# Patient Record
Sex: FEMALE | Race: WHITE | NOT HISPANIC OR LATINO | Employment: FULL TIME | URBAN - METROPOLITAN AREA
[De-identification: names, ages, dates, MRNs, and addresses within clinical notes are randomized per-mention and may not be internally consistent; named-entity substitution may affect disease eponyms.]

---

## 2023-02-24 PROBLEM — M25.552 LEFT HIP PAIN: Status: ACTIVE | Noted: 2023-02-24

## 2023-02-24 PROBLEM — L04.9 LYMPHADENITIS, ACUTE: Status: RESOLVED | Noted: 2023-02-24 | Resolved: 2023-02-24

## 2023-02-24 PROBLEM — M70.62 TROCHANTERIC BURSITIS OF LEFT HIP: Status: RESOLVED | Noted: 2023-02-24 | Resolved: 2023-02-24

## 2023-02-24 PROBLEM — H04.123 DRY EYE SYNDROME OF BOTH EYES: Status: ACTIVE | Noted: 2023-02-24

## 2023-02-24 PROBLEM — Z20.822 SUSPECTED COVID-19 VIRUS INFECTION: Status: RESOLVED | Noted: 2023-02-24 | Resolved: 2023-02-24

## 2023-02-24 PROBLEM — K21.9 LARYNGOPHARYNGEAL REFLUX: Status: ACTIVE | Noted: 2023-02-24

## 2023-02-24 PROBLEM — E78.00 ELEVATED SERUM CHOLESTEROL: Status: ACTIVE | Noted: 2023-02-24

## 2023-02-24 PROBLEM — R19.5 POSITIVE COLORECTAL CANCER SCREENING USING COLOGUARD TEST: Status: RESOLVED | Noted: 2023-02-24 | Resolved: 2023-02-24

## 2023-02-24 PROBLEM — M47.812 DJD (DEGENERATIVE JOINT DISEASE), CERVICAL: Status: ACTIVE | Noted: 2023-02-24

## 2023-02-24 PROBLEM — R53.83 OTHER FATIGUE: Status: ACTIVE | Noted: 2023-02-24

## 2023-02-24 PROBLEM — E55.9 VITAMIN D DEFICIENCY: Status: ACTIVE | Noted: 2023-02-24

## 2023-02-24 RX ORDER — FLUTICASONE PROPIONATE 50 MCG
SPRAY, SUSPENSION (ML) NASAL
COMMUNITY
End: 2024-03-19 | Stop reason: ALTCHOICE

## 2023-02-24 RX ORDER — FAMOTIDINE 20 MG/1
20 TABLET, FILM COATED ORAL 2 TIMES DAILY
COMMUNITY
End: 2024-04-23 | Stop reason: SDUPTHER

## 2023-02-24 RX ORDER — FERROUS SULFATE 325(65) MG
65 TABLET, DELAYED RELEASE (ENTERIC COATED) ORAL
COMMUNITY
End: 2023-05-04 | Stop reason: SDUPTHER

## 2023-02-24 RX ORDER — MELOXICAM 15 MG/1
15 TABLET ORAL
COMMUNITY
Start: 2022-09-01 | End: 2023-05-09 | Stop reason: SDUPTHER

## 2023-03-30 ENCOUNTER — TELEPHONE (OUTPATIENT)
Dept: PRIMARY CARE | Facility: CLINIC | Age: 63
End: 2023-03-30
Payer: COMMERCIAL

## 2023-03-30 NOTE — TELEPHONE ENCOUNTER
----- Message from LINDA Hernandez-CNP sent at 3/30/2023  7:42 AM EDT -----  Please advise Mammogram is normal Repeat one year

## 2023-04-03 ENCOUNTER — TELEPHONE (OUTPATIENT)
Dept: PRIMARY CARE | Facility: CLINIC | Age: 63
End: 2023-04-03
Payer: COMMERCIAL

## 2023-04-03 NOTE — TELEPHONE ENCOUNTER
PT is concerned about spot that have formed on her face and is requesting a dermatologist (or if she should be seen again in office) PT is concerned the spots could be something more. No pain or irritation from spots.

## 2023-04-05 DIAGNOSIS — L98.9 SKIN LESION OF FACE: Primary | ICD-10-CM

## 2023-04-13 ENCOUNTER — OFFICE VISIT (OUTPATIENT)
Dept: PRIMARY CARE | Facility: CLINIC | Age: 63
End: 2023-04-13
Payer: COMMERCIAL

## 2023-04-13 ENCOUNTER — APPOINTMENT (OUTPATIENT)
Dept: PRIMARY CARE | Facility: CLINIC | Age: 63
End: 2023-04-13
Payer: COMMERCIAL

## 2023-04-13 VITALS
DIASTOLIC BLOOD PRESSURE: 80 MMHG | SYSTOLIC BLOOD PRESSURE: 124 MMHG | HEART RATE: 77 BPM | HEIGHT: 64 IN | BODY MASS INDEX: 28.17 KG/M2 | WEIGHT: 165 LBS | OXYGEN SATURATION: 98 %

## 2023-04-13 DIAGNOSIS — I83.891 VARICOSE VEINS OF RIGHT LOWER EXTREMITY WITH EDEMA: ICD-10-CM

## 2023-04-13 DIAGNOSIS — M25.552 LEFT HIP PAIN: ICD-10-CM

## 2023-04-13 DIAGNOSIS — J30.9 ALLERGIC RHINITIS, UNSPECIFIED SEASONALITY, UNSPECIFIED TRIGGER: Primary | ICD-10-CM

## 2023-04-13 PROCEDURE — 1036F TOBACCO NON-USER: CPT | Performed by: NURSE PRACTITIONER

## 2023-04-13 PROCEDURE — 99214 OFFICE O/P EST MOD 30 MIN: CPT | Performed by: NURSE PRACTITIONER

## 2023-04-13 RX ORDER — AZELASTINE 1 MG/ML
1 SPRAY, METERED NASAL 2 TIMES DAILY
Qty: 30 ML | Refills: 12 | Status: SHIPPED | OUTPATIENT
Start: 2023-04-13 | End: 2023-09-15 | Stop reason: ALTCHOICE

## 2023-04-13 RX ORDER — CHOLECALCIFEROL (VITAMIN D3) 50 MCG
50 TABLET ORAL DAILY
COMMUNITY

## 2023-04-13 ASSESSMENT — COLUMBIA-SUICIDE SEVERITY RATING SCALE - C-SSRS
1. IN THE PAST MONTH, HAVE YOU WISHED YOU WERE DEAD OR WISHED YOU COULD GO TO SLEEP AND NOT WAKE UP?: NO
6. HAVE YOU EVER DONE ANYTHING, STARTED TO DO ANYTHING, OR PREPARED TO DO ANYTHING TO END YOUR LIFE?: NO
2. HAVE YOU ACTUALLY HAD ANY THOUGHTS OF KILLING YOURSELF?: NO

## 2023-04-13 ASSESSMENT — ENCOUNTER SYMPTOMS
PSYCHIATRIC NEGATIVE: 1
JOINT SWELLING: 1
RESPIRATORY NEGATIVE: 1
CONSTITUTIONAL NEGATIVE: 1
CARDIOVASCULAR NEGATIVE: 1
RHINORRHEA: 1

## 2023-04-13 ASSESSMENT — PATIENT HEALTH QUESTIONNAIRE - PHQ9
1. LITTLE INTEREST OR PLEASURE IN DOING THINGS: NOT AT ALL
2. FEELING DOWN, DEPRESSED OR HOPELESS: NOT AT ALL
SUM OF ALL RESPONSES TO PHQ9 QUESTIONS 1 AND 2: 0

## 2023-04-13 NOTE — PROGRESS NOTES
"Subjective   Patient ID: Fifi Avila is a 62 y.o. female who presents for Follow-up (6 month).    Patient here for routine follow-up.  Patient did see ophthalmologist due to dry eye syndrome.  Patient was found to have some blood behind her eye.  Patient was curious if this causes anemia.  Discussed with patient I do not feel this is difficult amount of blood in her eye to cause anemia.  I feel it was related to her giving blood and not feeling well.  We will recheck blood levels.    Patient did follow-up with physical therapy regarding her hip pain.  She states this is much improved.    Patient reports swelling below her right knee seems to worsen as the day goes on or if she standing for long periods of time she gets leg pain or fatigue.         Review of Systems   Constitutional: Negative.    HENT:  Positive for congestion, postnasal drip and rhinorrhea.    Respiratory: Negative.     Cardiovascular: Negative.    Musculoskeletal:  Positive for joint swelling.   Psychiatric/Behavioral: Negative.         Objective   /80   Pulse 77   Ht 1.626 m (5' 4\")   Wt 74.8 kg (165 lb)   SpO2 98%   BMI 28.32 kg/m²     Physical Exam  Constitutional:       Appearance: Normal appearance.   HENT:      Nose: Rhinorrhea present. No congestion.      Mouth/Throat:      Mouth: Mucous membranes are moist.   Eyes:      Conjunctiva/sclera: Conjunctivae normal.   Cardiovascular:      Rate and Rhythm: Normal rate.      Heart sounds: Normal heart sounds.   Pulmonary:      Effort: Pulmonary effort is normal.      Breath sounds: Normal breath sounds.   Musculoskeletal:      Right lower leg: Swelling present. No deformity, tenderness or bony tenderness. No edema.      Comments: Right lower leg medial aspect just below knee with some swelling noted.  Multiple varicose veins noted throughout lower extremity   Neurological:      Mental Status: She is alert.         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 Worsens throughout day worse with standing and has increasing pain. History of injury to right knee medial aspect.             Musculoskeletal    Left hip pain     She had several PT appointment\s             Other    Allergic rhinitis - Primary     Seasonal allergies .          Relevant Medications    azelastine (Astelin) 137 mcg (0.1 %) nasal spray    Other Relevant Orders    Referral to Vascular Medicine

## 2023-04-13 NOTE — PATIENT INSTRUCTIONS
Dr Crandall- Vascular specialist  [address] [place] [address] [phone]    I do not feel that you have swelling in your salivary gland at this time.  However if the tenderness continues please follow-up with the specialist the ENT    Please continue to do her stretching exercises    For the swelling in your right lower leg below your knee I placed a referral to a vascular specialist phone number as above    Please follow-up per routine every 6 months sooner if needed

## 2023-04-13 NOTE — ASSESSMENT & PLAN NOTE
Pt with increasing swelling in right knee. Worsens throughout day worse with standing and has increasing pain. History of injury to right knee medial aspect.

## 2023-04-18 ENCOUNTER — TELEPHONE (OUTPATIENT)
Dept: PRIMARY CARE | Facility: CLINIC | Age: 63
End: 2023-04-18
Payer: COMMERCIAL

## 2023-04-18 NOTE — TELEPHONE ENCOUNTER
----- Message from Fifi Avila sent at 4/4/2023  7:27 PM EDT -----  Regarding: Have you received my Eye records   Contact: [phone]  When i had my eyes checked in December the dr said there was blood inside my eye (not blood vessels on the white, but inside)    I requested they send you the records for review incase it may be anemia related

## 2023-05-03 DIAGNOSIS — Z00.00 ENCOUNTER FOR GENERAL ADULT MEDICAL EXAMINATION WITHOUT ABNORMAL FINDINGS: ICD-10-CM

## 2023-05-04 RX ORDER — FERROUS SULFATE 325(65) MG
TABLET ORAL
Qty: 90 TABLET | Refills: 1 | Status: SHIPPED | OUTPATIENT
Start: 2023-05-04 | End: 2024-04-23 | Stop reason: SDUPTHER

## 2023-05-09 DIAGNOSIS — M25.552 LEFT HIP PAIN: ICD-10-CM

## 2023-05-09 RX ORDER — MELOXICAM 15 MG/1
15 TABLET ORAL DAILY
Qty: 90 TABLET | Refills: 1 | Status: SHIPPED | OUTPATIENT
Start: 2023-05-09

## 2023-05-18 ENCOUNTER — OFFICE VISIT (OUTPATIENT)
Dept: PRIMARY CARE | Facility: CLINIC | Age: 63
End: 2023-05-18
Payer: COMMERCIAL

## 2023-05-18 VITALS
HEIGHT: 64 IN | BODY MASS INDEX: 28.61 KG/M2 | WEIGHT: 167.6 LBS | SYSTOLIC BLOOD PRESSURE: 128 MMHG | HEART RATE: 80 BPM | OXYGEN SATURATION: 98 % | DIASTOLIC BLOOD PRESSURE: 78 MMHG

## 2023-05-18 DIAGNOSIS — M62.830 LUMBAR PARASPINAL MUSCLE SPASM: ICD-10-CM

## 2023-05-18 DIAGNOSIS — M54.50 LUMBAR PAIN: Primary | ICD-10-CM

## 2023-05-18 PROBLEM — E78.00 ELEVATED SERUM CHOLESTEROL: Status: RESOLVED | Noted: 2023-02-24 | Resolved: 2023-05-18

## 2023-05-18 PROCEDURE — 99213 OFFICE O/P EST LOW 20 MIN: CPT | Performed by: NURSE PRACTITIONER

## 2023-05-18 PROCEDURE — 1036F TOBACCO NON-USER: CPT | Performed by: NURSE PRACTITIONER

## 2023-05-18 RX ORDER — TIZANIDINE 4 MG/1
4 TABLET ORAL EVERY 6 HOURS PRN
Qty: 30 TABLET | Refills: 0 | Status: SHIPPED | OUTPATIENT
Start: 2023-05-18 | End: 2024-03-19 | Stop reason: ALTCHOICE

## 2023-05-18 ASSESSMENT — COLUMBIA-SUICIDE SEVERITY RATING SCALE - C-SSRS
6. HAVE YOU EVER DONE ANYTHING, STARTED TO DO ANYTHING, OR PREPARED TO DO ANYTHING TO END YOUR LIFE?: NO
2. HAVE YOU ACTUALLY HAD ANY THOUGHTS OF KILLING YOURSELF?: NO
1. IN THE PAST MONTH, HAVE YOU WISHED YOU WERE DEAD OR WISHED YOU COULD GO TO SLEEP AND NOT WAKE UP?: NO

## 2023-05-18 ASSESSMENT — ENCOUNTER SYMPTOMS
BACK PAIN: 1
NEUROLOGICAL NEGATIVE: 1
SORE THROAT: 1
PSYCHIATRIC NEGATIVE: 1
CARDIOVASCULAR NEGATIVE: 1
CONSTITUTIONAL NEGATIVE: 1
GASTROINTESTINAL NEGATIVE: 1

## 2023-05-18 NOTE — PROGRESS NOTES
"Subjective   Patient ID: Fifi Avila is a 63 y.o. female who presents for Back Pain (For about 7 days. Pt states that she leaned over to look in a mirror and felt a twinge in her low back no it is in her mid to low back on the right side) and Sore Throat.    Pt here for acute lower back pain.  She has tried a heat/ice.  She has been taking naproxen 1 tablet twice a day instead of the Mobic.  Patient states she was gardening when it flared and feels its not improving.  Also has noticed increase in her allergy symptoms.  But has not been using the nasal sprays.  She knows the pollens been high.         Review of Systems   Constitutional: Negative.    HENT:  Positive for postnasal drip and sore throat.    Cardiovascular: Negative.    Gastrointestinal: Negative.    Musculoskeletal:  Positive for back pain.        See HPI   Neurological: Negative.    Psychiatric/Behavioral: Negative.         Objective   /78   Pulse 80   Ht 1.626 m (5' 4\")   Wt 76 kg (167 lb 9.6 oz)   SpO2 98%   BMI 28.77 kg/m²     Physical Exam  Cardiovascular:      Rate and Rhythm: Normal rate.      Heart sounds: Normal heart sounds.   Pulmonary:      Effort: Pulmonary effort is normal.      Breath sounds: Normal breath sounds.   Musculoskeletal:         General: Normal range of motion.      Lumbar back: Spasms and tenderness present. Negative right straight leg raise test and negative left straight leg raise test.   Skin:     Capillary Refill: Capillary refill takes less than 2 seconds.   Neurological:      Mental Status: She is alert.   Psychiatric:         Mood and Affect: Mood normal.         Behavior: Behavior normal.         Thought Content: Thought content normal.         Judgment: Judgment normal.         Assessment/Plan   Problem List Items Addressed This Visit    None  Visit Diagnoses       Lumbar pain    -  Primary    Relevant Medications    tiZANidine (Zanaflex) 4 mg tablet    Lumbar paraspinal muscle spasm        Relevant " Medications    tiZANidine (Zanaflex) 4 mg tablet          Discussed with patient do stretches we will try the Zanaflex in addition to the anti-inflammatory if symptoms worsen we will consider physical therapy referral

## 2023-05-18 NOTE — PATIENT INSTRUCTIONS
Please take your meloxicam daily you can take a half tab in the morning half in the evening if this helps with the pain better.  Or you can do the naproxen 2 tablets twice a day.  As we discussed you already know you do not want to take them both together.  You can continue to use heat and ice.  I also sent a muscle relaxer and printed out stretching exercises.  If symptoms worsen or do not improve may need to consider physical therapy consult

## 2023-05-24 ENCOUNTER — TELEPHONE (OUTPATIENT)
Dept: PRIMARY CARE | Facility: CLINIC | Age: 63
End: 2023-05-24
Payer: COMMERCIAL

## 2023-05-24 DIAGNOSIS — M25.561 ACUTE PAIN OF RIGHT KNEE: ICD-10-CM

## 2023-05-24 DIAGNOSIS — M25.552 LEFT HIP PAIN: ICD-10-CM

## 2023-05-24 NOTE — TELEPHONE ENCOUNTER
Pt is asking for a referral to [facility] regarding what was discussed at visit on 5/18/2023

## 2023-06-13 ENCOUNTER — OFFICE VISIT (OUTPATIENT)
Dept: PRIMARY CARE | Facility: CLINIC | Age: 63
End: 2023-06-13
Payer: COMMERCIAL

## 2023-06-13 VITALS
DIASTOLIC BLOOD PRESSURE: 76 MMHG | HEIGHT: 64 IN | WEIGHT: 167.6 LBS | BODY MASS INDEX: 28.61 KG/M2 | HEART RATE: 74 BPM | OXYGEN SATURATION: 99 % | SYSTOLIC BLOOD PRESSURE: 122 MMHG

## 2023-06-13 DIAGNOSIS — J01.10 ACUTE NON-RECURRENT FRONTAL SINUSITIS: Primary | ICD-10-CM

## 2023-06-13 PROCEDURE — 1036F TOBACCO NON-USER: CPT | Performed by: NURSE PRACTITIONER

## 2023-06-13 PROCEDURE — 99213 OFFICE O/P EST LOW 20 MIN: CPT | Performed by: NURSE PRACTITIONER

## 2023-06-13 RX ORDER — AMOXICILLIN AND CLAVULANATE POTASSIUM 875; 125 MG/1; MG/1
875 TABLET, FILM COATED ORAL 2 TIMES DAILY
Qty: 14 TABLET | Refills: 0 | Status: SHIPPED | OUTPATIENT
Start: 2023-06-13 | End: 2023-06-20

## 2023-06-13 RX ORDER — CETIRIZINE HYDROCHLORIDE 5 MG/1
5 TABLET ORAL DAILY
COMMUNITY
End: 2023-09-15 | Stop reason: ALTCHOICE

## 2023-06-13 ASSESSMENT — PATIENT HEALTH QUESTIONNAIRE - PHQ9
SUM OF ALL RESPONSES TO PHQ9 QUESTIONS 1 AND 2: 0
1. LITTLE INTEREST OR PLEASURE IN DOING THINGS: NOT AT ALL
2. FEELING DOWN, DEPRESSED OR HOPELESS: NOT AT ALL

## 2023-06-13 ASSESSMENT — COLUMBIA-SUICIDE SEVERITY RATING SCALE - C-SSRS
2. HAVE YOU ACTUALLY HAD ANY THOUGHTS OF KILLING YOURSELF?: NO
1. IN THE PAST MONTH, HAVE YOU WISHED YOU WERE DEAD OR WISHED YOU COULD GO TO SLEEP AND NOT WAKE UP?: NO
6. HAVE YOU EVER DONE ANYTHING, STARTED TO DO ANYTHING, OR PREPARED TO DO ANYTHING TO END YOUR LIFE?: NO

## 2023-06-13 ASSESSMENT — ENCOUNTER SYMPTOMS
HOARSE VOICE: 1
COUGH: 1
SHORTNESS OF BREATH: 1
SINUS PRESSURE: 1

## 2023-06-13 NOTE — PROGRESS NOTES
"Subjective   Patient ID: Fifi Avila is a 63 y.o. female who presents for Laryngitis, Sinusitis, and Nasal Congestion.    Sinusitis  The current episode started in the past 7 days. The problem has been gradually worsening since onset. There has been no fever. The pain is mild. Associated symptoms include congestion, coughing, a hoarse voice, shortness of breath, sinus pressure and sneezing. Past treatments include oral decongestants and nasal decongestants. The treatment provided mild relief.        Review of Systems   HENT:  Positive for congestion, hoarse voice, sinus pressure and sneezing.    Respiratory:  Positive for cough and shortness of breath.        Objective   /76   Pulse 74   Ht 1.626 m (5' 4\")   Wt 76 kg (167 lb 9.6 oz)   SpO2 99%   BMI 28.77 kg/m²     Physical Exam  Vitals reviewed.   HENT:      Right Ear: Tympanic membrane, ear canal and external ear normal.      Left Ear: Tympanic membrane, ear canal and external ear normal.      Nose: Congestion present.   Cardiovascular:      Rate and Rhythm: Normal rate.      Heart sounds: Normal heart sounds.   Pulmonary:      Effort: Pulmonary effort is normal.   Abdominal:      General: Abdomen is flat.   Skin:     General: Skin is warm.      Capillary Refill: Capillary refill takes less than 2 seconds.   Neurological:      General: No focal deficit present.      Mental Status: She is alert.         Assessment/Plan   Problem List Items Addressed This Visit    None  Visit Diagnoses       Acute non-recurrent frontal sinusitis    -  Primary    Relevant Medications    amoxicillin-pot clavulanate (Augmentin) 875-125 mg tablet               "

## 2023-06-21 DIAGNOSIS — J30.9 ALLERGIC RHINITIS, UNSPECIFIED SEASONALITY, UNSPECIFIED TRIGGER: Primary | ICD-10-CM

## 2023-09-15 ENCOUNTER — OFFICE VISIT (OUTPATIENT)
Dept: PRIMARY CARE | Facility: CLINIC | Age: 63
End: 2023-09-15
Payer: COMMERCIAL

## 2023-09-15 VITALS
HEIGHT: 64 IN | BODY MASS INDEX: 28.95 KG/M2 | DIASTOLIC BLOOD PRESSURE: 70 MMHG | TEMPERATURE: 97.8 F | WEIGHT: 169.6 LBS | HEART RATE: 93 BPM | OXYGEN SATURATION: 98 % | SYSTOLIC BLOOD PRESSURE: 118 MMHG

## 2023-09-15 DIAGNOSIS — J01.10 ACUTE NON-RECURRENT FRONTAL SINUSITIS: Primary | ICD-10-CM

## 2023-09-15 PROCEDURE — 99213 OFFICE O/P EST LOW 20 MIN: CPT | Performed by: NURSE PRACTITIONER

## 2023-09-15 PROCEDURE — 1036F TOBACCO NON-USER: CPT | Performed by: NURSE PRACTITIONER

## 2023-09-15 RX ORDER — SULFAMETHOXAZOLE AND TRIMETHOPRIM 800; 160 MG/1; MG/1
1 TABLET ORAL 2 TIMES DAILY
Qty: 14 TABLET | Refills: 0 | Status: SHIPPED | OUTPATIENT
Start: 2023-09-15 | End: 2023-09-22

## 2023-09-15 RX ORDER — AZELASTINE HYDROCHLORIDE, FLUTICASONE PROPIONATE 137; 50 UG/1; UG/1
SPRAY, METERED NASAL
COMMUNITY
Start: 2023-07-14 | End: 2024-04-23 | Stop reason: SDUPTHER

## 2023-09-15 RX ORDER — LEVOCETIRIZINE DIHYDROCHLORIDE 5 MG/1
5 TABLET, FILM COATED ORAL EVERY EVENING
COMMUNITY
Start: 2023-07-14 | End: 2024-04-30 | Stop reason: ALTCHOICE

## 2023-09-15 ASSESSMENT — ENCOUNTER SYMPTOMS
CHILLS: 0
HOARSE VOICE: 0
DIAPHORESIS: 0
HEADACHES: 1
SORE THROAT: 1
SINUS PRESSURE: 1
SHORTNESS OF BREATH: 0
NECK PAIN: 0
SWOLLEN GLANDS: 0
COUGH: 1

## 2023-09-15 ASSESSMENT — COLUMBIA-SUICIDE SEVERITY RATING SCALE - C-SSRS
2. HAVE YOU ACTUALLY HAD ANY THOUGHTS OF KILLING YOURSELF?: NO
6. HAVE YOU EVER DONE ANYTHING, STARTED TO DO ANYTHING, OR PREPARED TO DO ANYTHING TO END YOUR LIFE?: NO
1. IN THE PAST MONTH, HAVE YOU WISHED YOU WERE DEAD OR WISHED YOU COULD GO TO SLEEP AND NOT WAKE UP?: NO

## 2023-09-15 NOTE — PROGRESS NOTES
"Subjective   Patient ID: Fifinigel Avila is a 63 y.o. female who presents for Sinusitis.    Sinusitis  This is a new problem. The current episode started 1 to 4 weeks ago. The problem has been waxing and waning since onset. There has been no fever. Her pain is at a severity of 5/10. The pain is moderate. Associated symptoms include congestion, coughing, ear pain, headaches, sinus pressure, sneezing and a sore throat. Pertinent negatives include no chills, diaphoresis, hoarse voice, neck pain, shortness of breath or swollen glands. Treatments tried: mucinexD. The treatment provided mild relief.        Review of Systems   Constitutional:  Negative for chills and diaphoresis.   HENT:  Positive for congestion, ear pain, sinus pressure, sneezing and sore throat. Negative for hoarse voice.    Respiratory:  Positive for cough. Negative for shortness of breath.    Musculoskeletal:  Negative for neck pain.   Neurological:  Positive for headaches.       Objective   /70   Pulse 93   Temp 36.6 °C (97.8 °F)   Ht 1.626 m (5' 4\")   Wt 76.9 kg (169 lb 9.6 oz)   SpO2 98%   BMI 29.11 kg/m²     Physical Exam  HENT:      Head: Normocephalic.      Right Ear: Tympanic membrane, ear canal and external ear normal.      Left Ear: Tympanic membrane, ear canal and external ear normal.      Nose: Mucosal edema and congestion present.      Right Sinus: Maxillary sinus tenderness and frontal sinus tenderness present.      Left Sinus: Maxillary sinus tenderness and frontal sinus tenderness present.      Mouth/Throat:      Pharynx: Oropharynx is clear.   Eyes:      Conjunctiva/sclera: Conjunctivae normal.   Cardiovascular:      Rate and Rhythm: Normal rate.      Heart sounds: Normal heart sounds.   Pulmonary:      Effort: Pulmonary effort is normal.      Breath sounds: Normal breath sounds.   Skin:     General: Skin is warm.      Capillary Refill: Capillary refill takes less than 2 seconds.   Neurological:      Mental Status: She is " alert.   Psychiatric:         Mood and Affect: Mood normal.         Behavior: Behavior normal.         Thought Content: Thought content normal.         Judgment: Judgment normal.         Assessment/Plan   Problem List Items Addressed This Visit    None  Visit Diagnoses       Acute non-recurrent frontal sinusitis    -  Primary    Relevant Medications    sulfamethoxazole-trimethoprim (Bactrim DS) 800-160 mg tablet

## 2023-09-15 NOTE — PATIENT INSTRUCTIONS
What is Sinusitis?    Sinusitis, commonly known as a sinus infection, is an inflammation or infection of the sinus cavities - the hollow spaces within the bones around your nose, eyes, and cheeks. These cavities are normally filled with air, but when they become blocked and filled with fluid, they can become a breeding ground for bacteria or viruses, leading to infection.    Symptoms:    Facial Pain or Pressure: You may experience pain or pressure around your forehead, eyes, cheeks, or the bridge of your nose. This pain might worsen when you bend forward or lie down.    Nasal Congestion: Your nasal passages may feel blocked or congested, making it difficult to breathe through your nose.    Runny or Discolored Nasal Discharge: You might notice thick, discolored mucus draining from your nose or down the back of your throat.    Coughing: Postnasal drip, where mucus drips down your throat, can lead to coughing, especially at night.    Reduced Sense of Smell and Taste: Inflammation and congestion can affect your ability to smell and taste.    Headache: The pressure in your sinuses can cause headaches, often felt around the forehead and eyes.    Bad Breath: Foul-smelling breath can be a result of the infected mucus draining down the back of your throat.    Causes:    Sinusitis can be caused by various factors, including:    Viral Infections: Often triggered by the common cold or other upper respiratory infections.    Bacterial Infections: Bacterial growth in blocked sinuses can lead to an infection.    Allergies: Allergic reactions can cause inflammation and blockages in the sinus cavities.    Nasal Polyps: Small growths in the nasal passages can obstruct the sinuses.    Deviated Septum: A crooked nasal septum can impede sinus drainage.    Treatment:    Rest and Hydration: Resting and staying well-hydrated can help your body fight the infection.    Nasal Irrigation: Saline nasal sprays or rinses can help clear mucus and  relieve congestion.    Warm Compresses: Applying warm compresses over your sinuses can help soothe pain and pressure.    Pain Relievers: Over-the-counter pain relievers like acetaminophen or ibuprofen can help manage pain and fever.    Decongestants: Short-term use of decongestant nasal sprays can help reduce swelling, but they should not be used for more than a few days to avoid rebound congestion.    Antibiotics: If a bacterial infection is suspected, antibiotics are prescribed at that time.    Allergy Management: If allergies contribute to your sinusitis, managing them can help prevent future episodes.    When to Seek Medical Attention:    If your symptoms worsen or persist for more than 10 days, or if you experience severe facial pain, high fever, confusion, double vision, or difficulty breathing, it's important to seek medical attention promptly. These could be signs of a more serious infection or complication.

## 2023-10-06 ENCOUNTER — CLINICAL SUPPORT (OUTPATIENT)
Dept: PRIMARY CARE | Facility: CLINIC | Age: 63
End: 2023-10-06
Payer: COMMERCIAL

## 2023-10-06 DIAGNOSIS — Z23 NEED FOR VACCINATION: ICD-10-CM

## 2023-10-06 PROCEDURE — 90686 IIV4 VACC NO PRSV 0.5 ML IM: CPT | Performed by: FAMILY MEDICINE

## 2023-10-06 PROCEDURE — 90471 IMMUNIZATION ADMIN: CPT | Performed by: FAMILY MEDICINE

## 2023-10-06 RX ORDER — ACETAMINOPHEN 500 MG
500 TABLET ORAL EVERY 6 HOURS PRN
COMMUNITY

## 2023-10-06 RX ORDER — GLUCOSAM/CHONDRO/HERB 149/HYAL 750-100 MG
1 TABLET ORAL EVERY 24 HOURS
COMMUNITY
End: 2024-04-30 | Stop reason: ALTCHOICE

## 2023-12-18 ENCOUNTER — LAB (OUTPATIENT)
Dept: LAB | Facility: LAB | Age: 63
End: 2023-12-18
Payer: COMMERCIAL

## 2023-12-18 DIAGNOSIS — J30.1 ALLERGIC RHINITIS DUE TO POLLEN: Primary | ICD-10-CM

## 2023-12-18 PROCEDURE — 86003 ALLG SPEC IGE CRUDE XTRC EA: CPT

## 2023-12-18 PROCEDURE — 36415 COLL VENOUS BLD VENIPUNCTURE: CPT

## 2023-12-19 LAB
A ALTERNATA IGE QN: <0.1 KU/L
A FUMIGATUS IGE QN: <0.1 KU/L
BERMUDA GRASS IGE QN: <0.1 KU/L
BOXELDER IGE QN: <0.1 KU/L
CAT DANDER IGE QN: <0.1 KU/L
COMMON RAGWEED IGE QN: <0.1 KU/L
COTTONWOOD IGE QN: <0.1 KU/L
D FARINAE IGE QN: <0.1 KU/L
D PTERONYSS IGE QN: <0.1 KU/L
JOHNSON GRASS IGE QN: <0.1 KU/L
SILVER BIRCH IGE QN: <0.1 KU/L
TIMOTHY IGE QN: <0.1 KU/L
WHITE OAK IGE QN: <0.1 KU/L

## 2023-12-22 ENCOUNTER — HOSPITAL ENCOUNTER (OUTPATIENT)
Dept: RADIOLOGY | Facility: HOSPITAL | Age: 63
Discharge: HOME | End: 2023-12-22
Payer: COMMERCIAL

## 2023-12-22 DIAGNOSIS — Z13.6 ENCOUNTER FOR SCREENING FOR CARDIOVASCULAR DISORDERS: ICD-10-CM

## 2023-12-22 PROCEDURE — 75571 CT HRT W/O DYE W/CA TEST: CPT

## 2024-01-15 NOTE — PROGRESS NOTES
Subjective   Reason for Visit: Fifi Avila is a 63 y.o. female presenting as a new pt, here for a CPE/est care      No chief complaint on file.      HPI  Fifi presents today as new pt to me, due for Annual CPE  Previous PCP: KATHERIN Muhammad; LOV     PMH:  PSH:  SOCIAL:    Do you take any herbs or supplements that were not prescribed by a doctor?   Are you taking calcium supplements?   Are you taking aspirin daily?  Colon Cancer Screening:   LMP:  PAP:  Mammogram:    GYN:   Fasting blood work:   Last eye exam:   Last dental Exam:   Exercise:  Mood:  Sleep:   Diet:      Lab on 12/18/2023   Component Date Value Ref Range Status    Bernabe Grass IgE 12/18/2023 <0.10  <0.10 kU/L Final    Bermuda Grass IgE 12/18/2023 <0.10  <0.10 kU/L Final    Oak IgE 12/18/2023 <0.10  <0.10 kU/L Final    Ward IgE 12/18/2023 <0.10  <0.10 kU/L Final    Candelario Grass IgE 12/18/2023 <0.10  <0.10 kU/L Final    Aspergillus Fumigatus IgE 12/18/2023 <0.10  <0.10 kU/L Final    Cat Dander IgE 12/18/2023 <0.10  <0.10 kU/L Final    Dust Mite (D. pteronyssinus) IgE 12/18/2023 <0.10  <0.10 kU/L Final    Dust Mite (D. farinae) IgE 12/18/2023 <0.10  <0.10 kU/L Final    Box-Elder IgE 12/18/2023 <0.10  <0.10 kU/L Final    Common Ragweed IgE 12/18/2023 <0.10  <0.10 kU/L Final    Common Silver Birch IgE 12/18/2023 <0.10  <0.10 kU/L Final    Alternaria Alternata IgE 12/18/2023 <0.10  <0.10 kU/L Final         Patient Care Team:  LINDA Mckinney-CNP as PCP - General (Family Medicine)     Review of Systems  All 13 systems were reviewed and are within normal limits except positive and pertinent negative responses which are noted below or in HPI.      Objective   Vitals:  There were no vitals taken for this visit.      Current Outpatient Medications   Medication Instructions    acetaminophen (ACETAMINOPHEN EXTRA STRENGTH) 500 mg, oral, Every 6 hours PRN    azelastine-fluticasone 137-50 mcg/spray spray,non-aerosol SPRAY 1 SPRAY INTO EACH NOSTRIL TWICE A  DAY FOR 30 DAYS    cholecalciferol (VITAMIN D-3) 50 mcg, oral, Daily    famotidine (PEPCID) 20 mg, oral, 2 times daily    ferrous sulfate 325 (65 Fe) MG tablet TAKE 1 TABLET BY MOUTH EVERY DAY WITH FOOD    fluticasone (Flonase) 50 mcg/actuation nasal spray Shake gently. Before first use, prime pump. After use, clean tip and replace cap.    levocetirizine (XYZAL) 5 mg, oral, Every evening    meloxicam (MOBIC) 15 mg, oral, Daily, Every other day    omega 3-dha-epa-fish oil (Fish OiL) 1,000 mg (120 mg-180 mg) capsule 1 capsule, oral, Every 24 hours    tiZANidine (ZANAFLEX) 4 mg, oral, Every 6 hours PRN       Physical Exam    Assessment/Plan   {Assess/PlanSmartLinks:[id number]}

## 2024-01-16 ENCOUNTER — APPOINTMENT (OUTPATIENT)
Dept: PRIMARY CARE | Facility: CLINIC | Age: 64
End: 2024-01-16
Payer: COMMERCIAL

## 2024-01-22 ENCOUNTER — TELEPHONE (OUTPATIENT)
Dept: PRIMARY CARE | Facility: CLINIC | Age: 64
End: 2024-01-22
Payer: COMMERCIAL

## 2024-01-22 NOTE — TELEPHONE ENCOUNTER
Reviewed chart as well as [facility], nothing recently last pap scanned on chart form 2011. Pt. Notified nothing from [place] she wondered Advised no. Did et her know if paps have been normal generally they are recommended q 5 years now, but to discuss with Vicenta Ha CNP when she sees her.

## 2024-01-30 DIAGNOSIS — K21.9 LARYNGOPHARYNGEAL REFLUX (LPR): Primary | ICD-10-CM

## 2024-01-30 NOTE — TELEPHONE ENCOUNTER
Patient was last treated in the office 2/2023, please refill. Patient will need appointment soon.

## 2024-03-19 ENCOUNTER — OFFICE VISIT (OUTPATIENT)
Dept: PRIMARY CARE | Facility: CLINIC | Age: 64
End: 2024-03-19
Payer: COMMERCIAL

## 2024-03-19 VITALS
WEIGHT: 171 LBS | DIASTOLIC BLOOD PRESSURE: 84 MMHG | SYSTOLIC BLOOD PRESSURE: 136 MMHG | HEIGHT: 64 IN | HEART RATE: 65 BPM | BODY MASS INDEX: 29.19 KG/M2 | OXYGEN SATURATION: 97 %

## 2024-03-19 DIAGNOSIS — Z00.00 ANNUAL PHYSICAL EXAM: Primary | ICD-10-CM

## 2024-03-19 DIAGNOSIS — R73.01 ELEVATED FASTING GLUCOSE: ICD-10-CM

## 2024-03-19 DIAGNOSIS — R03.0 ELEVATED BLOOD PRESSURE READING: ICD-10-CM

## 2024-03-19 DIAGNOSIS — Z12.39 BREAST SCREENING: ICD-10-CM

## 2024-03-19 PROBLEM — D22.5 MELANOCYTIC NEVI OF TRUNK: Status: ACTIVE | Noted: 2023-08-10

## 2024-03-19 PROBLEM — D22.4 MELANOCYTIC NEVI OF SCALP AND NECK: Status: ACTIVE | Noted: 2023-08-10

## 2024-03-19 PROBLEM — D22.39 MELANOCYTIC NEVI OF OTHER PARTS OF FACE: Status: ACTIVE | Noted: 2023-08-10

## 2024-03-19 PROBLEM — D18.01 HEMANGIOMA OF SKIN AND SUBCUTANEOUS TISSUE: Status: ACTIVE | Noted: 2023-08-10

## 2024-03-19 PROBLEM — H52.13 MYOPIA OF BOTH EYES: Status: ACTIVE | Noted: 2021-10-15

## 2024-03-19 PROBLEM — D22.60 MELANOCYTIC NEVI OF UNSPECIFIED UPPER LIMB, INCLUDING SHOULDER: Status: ACTIVE | Noted: 2023-08-10

## 2024-03-19 PROBLEM — M25.552 LEFT HIP PAIN: Status: RESOLVED | Noted: 2023-02-24 | Resolved: 2024-03-19

## 2024-03-19 PROBLEM — J30.1 ALLERGIC RHINITIS DUE TO POLLEN: Status: ACTIVE | Noted: 2024-03-19

## 2024-03-19 PROBLEM — H52.203 UNSPECIFIED ASTIGMATISM, BILATERAL: Status: ACTIVE | Noted: 2021-10-15

## 2024-03-19 PROBLEM — D22.70 MELANOCYTIC NEVI OF UNSPECIFIED LOWER LIMB, INCLUDING HIP: Status: ACTIVE | Noted: 2023-08-10

## 2024-03-19 LAB — POC HEMOGLOBIN A1C: 5.5 % (ref 4.2–6.5)

## 2024-03-19 PROCEDURE — 83036 HEMOGLOBIN GLYCOSYLATED A1C: CPT | Performed by: NURSE PRACTITIONER

## 2024-03-19 PROCEDURE — 1036F TOBACCO NON-USER: CPT | Performed by: NURSE PRACTITIONER

## 2024-03-19 PROCEDURE — 99396 PREV VISIT EST AGE 40-64: CPT | Performed by: NURSE PRACTITIONER

## 2024-03-19 RX ORDER — ICOSAPENT ETHYL 1 G/1
CAPSULE ORAL
COMMUNITY
Start: 2024-01-15

## 2024-03-19 ASSESSMENT — PATIENT HEALTH QUESTIONNAIRE - PHQ9
10. IF YOU CHECKED OFF ANY PROBLEMS, HOW DIFFICULT HAVE THESE PROBLEMS MADE IT FOR YOU TO DO YOUR WORK, TAKE CARE OF THINGS AT HOME, OR GET ALONG WITH OTHER PEOPLE: SOMEWHAT DIFFICULT
SUM OF ALL RESPONSES TO PHQ9 QUESTIONS 1 AND 2: 2
2. FEELING DOWN, DEPRESSED OR HOPELESS: SEVERAL DAYS
1. LITTLE INTEREST OR PLEASURE IN DOING THINGS: SEVERAL DAYS

## 2024-03-19 ASSESSMENT — COLUMBIA-SUICIDE SEVERITY RATING SCALE - C-SSRS
2. HAVE YOU ACTUALLY HAD ANY THOUGHTS OF KILLING YOURSELF?: NO
6. HAVE YOU EVER DONE ANYTHING, STARTED TO DO ANYTHING, OR PREPARED TO DO ANYTHING TO END YOUR LIFE?: NO

## 2024-03-19 ASSESSMENT — ENCOUNTER SYMPTOMS
LOSS OF SENSATION IN FEET: 0
OCCASIONAL FEELINGS OF UNSTEADINESS: 0
DEPRESSION: 0

## 2024-03-19 NOTE — PROGRESS NOTES
"Subjective   Reason for Visit: Fifi Avila is an 63 y.o. female here for a CPE     Chief Complaint   Patient presents with    New Patient Visit     HAVING A LOT OF GI PROBLEMS,        HPI  Fifi is a 62 yo female presenting today to [facility] care     PCP: Pt moved back to [place] @ 2 yrs ago   Saw KATHERIN Muhammad once, then she went to [facility] for a short time    Dx: ALLERGIC RHINITIS, VITAMIN D DEF, LARYNGOPHARYNGEAL REFLUX, SLAVA, HYPERTRIGLYCERIDEMIA,      Occupation: FT as graphic design     Do you take any herbs or supplements that were not prescribed by a doctor? Vitamin D, MVI  Are you taking calcium supplements? Denies   Colon Cancer Screening: + COLOGRD IN 2022, Pt had colonoscopy and reports that it was fine ([facility])   LMP: Menopause 2008  PAP: DUE   Mammogram:  DUE   GYN: NONE   Fasting blood work: UTD   Last eye exam: 2023  Last dental Exam: 2023  Exercise: Not regularly     Pt is seeing GI next week for ongoing issues (@ 6 months) for urgency to have a BM  She does avoid gluten for the past 8 yrs  Stool is soft, not liquid, but does get cramping and has lower abdominal pain and is TTP today   Had colonoscopy in 2022    Pt had B/L vein stripping with Dr. Mohseni last year   No concerns today     [facility], will check A1C today     Allergies   Allergen Reactions    Nabumetone Unknown, GI bleeding, GI intolerance and Other     Caused blood in stool    Amoxicillin-Pot Clavulanate Diarrhea       Patient Care Team:  LINDA Mckinney-CNP as PCP - General (Family Medicine)     Review of Systems  ROS was completed and all systems are negative with the exception of what was noted in the the HPI.       Objective   Vitals:  /84   Pulse 65   Ht 1.626 m (5' 4\")   Wt 77.6 kg (171 lb)   SpO2 97%   BMI 29.35 kg/m²       Current Outpatient Medications   Medication Instructions    acetaminophen (ACETAMINOPHEN EXTRA STRENGTH) 500 mg, oral, Every 6 hours PRN    azelastine-fluticasone " 137-50 mcg/spray spray,non-aerosol SPRAY 1 SPRAY INTO EACH NOSTRIL TWICE A DAY FOR 30 DAYS    cholecalciferol (VITAMIN D-3) 50 mcg, oral, Daily    famotidine (PEPCID) 20 mg, oral, 2 times daily    ferrous sulfate 325 (65 Fe) MG tablet TAKE 1 TABLET BY MOUTH EVERY DAY WITH FOOD    icosapent ethyL (Vascepa) 1 gram capsule     levocetirizine (XYZAL) 5 mg, oral, Every evening    meloxicam (MOBIC) 15 mg, oral, Daily, Every other day    omega 3-dha-epa-fish oil (Fish OiL) 1,000 mg (120 mg-180 mg) capsule 1 capsule, oral, Every 24 hours     Office Visit on 03/19/2024   Component Date Value Ref Range Status    POC HEMOGLOBIN A1c 03/19/2024 5.5  4.2 - 6.5 % Final         Physical Exam  Vitals reviewed.   Cardiovascular:      Pulses: Normal pulses.      Heart sounds: Normal heart sounds.   Pulmonary:      Effort: Pulmonary effort is normal.      Breath sounds: Normal breath sounds.   Abdominal:      General: Bowel sounds are normal.      Tenderness: There is abdominal tenderness.       Neurological:      Mental Status: She is alert and oriented to person, place, and time.   Psychiatric:         Mood and Affect: Mood normal.         Assessment/Plan   Problem List Items Addressed This Visit             ICD-10-CM    Annual physical exam - Primary Z00.00     - Counseled on healthy diet and regular exercise  - Fall avoidance information provided  - Personalized prevention plan provided   - Mammogram ordered  - Colon and PAP are UTD  - Vaccines UTD             Other Visit Diagnoses         Codes    Elevated fasting glucose     R73.01    Relevant Orders    POCT glycosylated hemoglobin (Hb A1C) manually resulted (Completed)    Elevated blood pressure reading     R03.0    Breast screening     Z12.39    Relevant Orders    BI mammo bilateral screening tomosynthesis

## 2024-03-19 NOTE — PATIENT INSTRUCTIONS
Thank you for seeing me today.  It was a pleasure to meet you!    Today we did your Annual Physical Exam and discussed the following:     See GI as scheduled next week    Your A1C was 5.5%; this is normal    Schedule Mammogram    RTC ANNUALLY AND AS NEEDED

## 2024-03-22 ENCOUNTER — HOSPITAL ENCOUNTER (OUTPATIENT)
Dept: RADIOLOGY | Facility: HOSPITAL | Age: 64
Discharge: HOME | End: 2024-03-22
Payer: COMMERCIAL

## 2024-03-22 DIAGNOSIS — R19.7 DIARRHEA, UNSPECIFIED: ICD-10-CM

## 2024-03-22 LAB
CREAT SERPL-MCNC: 0.7 MG/DL (ref 0.6–1.3)
GFR SERPL CREATININE-BSD FRML MDRD: >90 ML/MIN/1.73M*2

## 2024-03-22 PROCEDURE — 2550000001 HC RX 255 CONTRASTS

## 2024-03-22 PROCEDURE — 82565 ASSAY OF CREATININE: CPT

## 2024-03-22 PROCEDURE — 74177 CT ABD & PELVIS W/CONTRAST: CPT

## 2024-03-22 PROCEDURE — 74177 CT ABD & PELVIS W/CONTRAST: CPT | Performed by: RADIOLOGY

## 2024-03-22 RX ADMIN — IOHEXOL 75 ML: 350 INJECTION, SOLUTION INTRAVENOUS at 08:35

## 2024-04-02 ENCOUNTER — TELEPHONE (OUTPATIENT)
Dept: PRIMARY CARE | Facility: CLINIC | Age: 64
End: 2024-04-02
Payer: COMMERCIAL

## 2024-04-02 NOTE — TELEPHONE ENCOUNTER
Patient left a voicemail about needing a referral for an ultrasound for her gal bladder.  She said she had spoken to you about it before but was unsure if one was ever put in.

## 2024-04-10 ENCOUNTER — APPOINTMENT (OUTPATIENT)
Dept: RADIOLOGY | Facility: HOSPITAL | Age: 64
End: 2024-04-10
Payer: COMMERCIAL

## 2024-04-17 ENCOUNTER — HOSPITAL ENCOUNTER (OUTPATIENT)
Dept: RADIOLOGY | Facility: HOSPITAL | Age: 64
Discharge: HOME | End: 2024-04-17
Payer: COMMERCIAL

## 2024-04-17 VITALS — WEIGHT: 171 LBS | HEIGHT: 64 IN | BODY MASS INDEX: 29.19 KG/M2

## 2024-04-17 DIAGNOSIS — Z12.39 BREAST SCREENING: ICD-10-CM

## 2024-04-17 PROCEDURE — 77067 SCR MAMMO BI INCL CAD: CPT

## 2024-04-17 PROCEDURE — 77063 BREAST TOMOSYNTHESIS BI: CPT | Performed by: STUDENT IN AN ORGANIZED HEALTH CARE EDUCATION/TRAINING PROGRAM

## 2024-04-17 PROCEDURE — 77067 SCR MAMMO BI INCL CAD: CPT | Performed by: STUDENT IN AN ORGANIZED HEALTH CARE EDUCATION/TRAINING PROGRAM

## 2024-04-17 NOTE — RESULT ENCOUNTER NOTE
Fifi Avila    Your mammogram was NEGATIVE.  We will repeat the screening in 1 year and continue screenings until age 74.    Respectfully,   [facility]

## 2024-04-22 ENCOUNTER — TELEPHONE (OUTPATIENT)
Dept: PRIMARY CARE | Facility: CLINIC | Age: 64
End: 2024-04-22

## 2024-04-22 DIAGNOSIS — J30.9 ALLERGIC RHINITIS, UNSPECIFIED SEASONALITY, UNSPECIFIED TRIGGER: Primary | ICD-10-CM

## 2024-04-22 DIAGNOSIS — K21.9 LARYNGOPHARYNGEAL REFLUX: ICD-10-CM

## 2024-04-22 DIAGNOSIS — Z00.00 ENCOUNTER FOR GENERAL ADULT MEDICAL EXAMINATION WITHOUT ABNORMAL FINDINGS: ICD-10-CM

## 2024-04-23 RX ORDER — FAMOTIDINE 20 MG/1
20 TABLET, FILM COATED ORAL 2 TIMES DAILY
Qty: 180 TABLET | Refills: 3 | Status: SHIPPED | OUTPATIENT
Start: 2024-04-23

## 2024-04-23 RX ORDER — FERROUS SULFATE 325(65) MG
1 TABLET ORAL DAILY
Qty: 90 TABLET | Refills: 3 | Status: SHIPPED | OUTPATIENT
Start: 2024-04-23

## 2024-04-23 RX ORDER — FAMOTIDINE 20 MG/1
20 TABLET, FILM COATED ORAL 2 TIMES DAILY
Qty: 180 TABLET | Refills: 1 | OUTPATIENT
Start: 2024-04-23

## 2024-04-23 RX ORDER — AZELASTINE HYDROCHLORIDE, FLUTICASONE PROPIONATE 137; 50 UG/1; UG/1
SPRAY, METERED NASAL
Qty: 1 EACH | Refills: 1 | Status: SHIPPED | OUTPATIENT
Start: 2024-04-23

## 2024-04-29 NOTE — PROGRESS NOTES
"Subjective   Chief Complaint   Patient presents with    Abdominal Pain     Patient comes in today with a complaint of pain in her lower abdomen with diarrhea. She has been taking metamucil, pepto and numerous vitamins. She is concerned about the iron making her feel this way.  Patient states she had a CT and was told she had Gallbladder sludge. Patient says she was prescribed dicyclomine for these issues but has not taken it because she doesn't like taking pills.       Patient ID: Fifi Avila is a 63 y.o. female who presents for Abdominal Pain (Patient comes in today with a complaint of pain in her lower abdomen with diarrhea. She has been taking metamucil, pepto and numerous vitamins. She is concerned about the iron making her feel this way.  Patient states she had a CT and was told she had Gallbladder sludge. Patient says she was prescribed dicyclomine for these issues but has not taken it because she doesn't like taking pills.).    HPI  Fifi is a 64 yo female presenting today for c/o \"diarrhea and abdominal cramping\" that occurs intermittently    Pt can not relate occurrences to any specific food, although she does recall that she does have cheese/dairy often within 12 hours of these occurrences.  Pt had CT abd in March (ordered per previous PCP), + sludge in gallbladder  Pt denies nausea/vomiting  Will have mild \"4-5/10\" RUQ pain @ 2 hours after she eats sometimes.     Pt is seeing GI, Dr. Hsieh---> has appt 5/9/24  Pt has not tried Rx Dicyclomine     Allergies   Allergen Reactions    Nabumetone Unknown, GI bleeding, GI intolerance and Other     Caused blood in stool    Amoxicillin-Pot Clavulanate Diarrhea       Review of Systems  ROS was completed and all systems are negative with the exception of what was noted in the the HPI.       Objective   /81   Pulse 77   Ht 1.626 m (5' 4\")   Wt 77.6 kg (171 lb)   SpO2 96%   BMI 29.35 kg/m²      Current Outpatient Medications   Medication Instructions    " acetaminophen (ACETAMINOPHEN EXTRA STRENGTH) 500 mg, oral, Every 6 hours PRN    azelastine-fluticasone (Dymista) 137-50 mcg/spray nasal spray SPRAY 1 SPRAY INTO EACH NOSTRIL TWICE A DAY FOR 30 DAYS    cholecalciferol (VITAMIN D-3) 50 mcg, oral, Daily    famotidine (PEPCID) 20 mg, oral, 2 times daily    ferrous sulfate, 325 mg ferrous sulfate, tablet 1 tablet, oral, Daily, Take with food.    icosapent ethyL (Vascepa) 1 gram capsule     meloxicam (MOBIC) 15 mg, oral, Daily, Every other day       CT abdomen pelvis w IV contrast 03/22/2024    Narrative  Interpreted By:  Brown Zavala,  STUDY:  CT ABDOMEN PELVIS W IV CONTRAST;  3/22/2024 8:37 am    INDICATION:  Signs/Symptoms:R19.7 Diarrhea.    COMPARISON:  None.    ACCESSION NUMBER(S):  [id number]    ORDERING CLINICIAN:  MALATHI BLACKMON    TECHNIQUE:  Contiguous axial images were obtained through the abdomen and pelvis  after the administration of  75 mL Omnipaque 350 intravenous  contrast. Coronal and sagittal reformations were made.    FINDINGS:  LOWER CHEST:  Mild dependent atelectasis present in the lung bases.    ABDOMEN:    LIVER:  Within normal limits.    BILE DUCTS:  Nondilated.    GALLBLADDER:  Faint intermediate attenuation material within the gallbladder may  represent stones or sludge. No pericholecystic inflammation.    PANCREAS:  Within normal limits.    SPLEEN:  Within normal limits.    ADRENAL GLANDS:  Within normal limits.    KIDNEYS AND URETERS:  The kidneys enhance symmetrically without focal lesion.  No  hydroureteronephrosis bilaterally.    Urinary bladder is unremarkable.    VESSELS:  Few scattered small atherosclerotic calcifications are seen in the  aorta and branch vessels. No aortic aneurysm. IVC is unremarkable.    BOWEL:  No bowel obstruction. Appendix is normal.    No focal diverticular disease. Mild wall prominence of the mid-distal  sigmoid colon likely is exaggerated by underdistention. No focal  pericolonic  inflammation.    PERITONEUM/RETROPERITONEUM/LYMPH NODES:  No ascites or free air, no fluid collection.    No retroperitoneal fluid collection or lymphadenopathy.    ABDOMINAL WALL:  Unremarkable.    BONE AND SOFT TISSUE:  Lumbar mild dextrocurvature is centered at L2-3. Mild multilevel disc  space narrowing and endplate spurring present in the spine.    Impression  Faint intermediate attenuation material within the gallbladder may  represent stones/sludge. No pericholecystic inflammation. No biliary  dilation.    Normal appendix. No bowel obstruction. No diverticulitis.        MACRO:  None.    Signed by: Brown Zavala 3/23/2024 9:19 AM  Dictation workstation:   [id number]      Physical Exam  Abdominal:      General: Abdomen is flat. Bowel sounds are normal. There is no distension.             Assessment/Plan   Problem List Items Addressed This Visit    None  Visit Diagnoses         Codes    Nonspecific abdominal pain    -  Primary R10.9    Try Dicyclomine as prescribed  See GI as scheduled on 5/9/24  Try avoiding dairy and see how your symptoms do

## 2024-04-30 ENCOUNTER — OFFICE VISIT (OUTPATIENT)
Dept: PRIMARY CARE | Facility: CLINIC | Age: 64
End: 2024-04-30
Payer: COMMERCIAL

## 2024-04-30 VITALS
HEIGHT: 64 IN | SYSTOLIC BLOOD PRESSURE: 133 MMHG | DIASTOLIC BLOOD PRESSURE: 81 MMHG | BODY MASS INDEX: 29.19 KG/M2 | HEART RATE: 77 BPM | WEIGHT: 171 LBS | OXYGEN SATURATION: 96 %

## 2024-04-30 DIAGNOSIS — R10.9 NONSPECIFIC ABDOMINAL PAIN: Primary | ICD-10-CM

## 2024-04-30 PROCEDURE — 1036F TOBACCO NON-USER: CPT | Performed by: NURSE PRACTITIONER

## 2024-04-30 PROCEDURE — 99212 OFFICE O/P EST SF 10 MIN: CPT | Performed by: NURSE PRACTITIONER

## 2024-04-30 NOTE — PATIENT INSTRUCTIONS
Thank you for seeing me today.  It was a pleasure to see you again!    #ABD PAIN, NONSPECIFIC  Rx Dicyclomine   See GI as scheduled   Keep food log and document symptoms     For assistance with scheduling referrals or consultations, please call [phone] or [phone].    For laboratory, radiology, and other tests, please call [phone] ([phone] for pediatrics).   If you do not get results within 7-10 days, or you have any questions or concerns, please send a message, call the office ([phone]), or return to the office for a follow-up appointment.     For acute/sick visits, if you are unable to get an office visit, you can do a  On Demand Virtual Visit that is accessible via your My Chart account.  For emergencies, call 9-1-1 or go to the nearest Emergency Department.     Please schedule additional appointment(s) to address concern(s) not addressed today.    Please review prescription inserts and published information for possible adverse effects of all medications.     In general, results are discussed over the phone or via  [employer].     You can see your health information, review clinical summaries from office visits & test results online when you follow your health with MY  Chart, a personal health record.   To sign up go to [email or web address].   If you need assistance with signing up or trouble getting into your account call [employer] Patient Line 24/7 at [phone]     [facility] AS NEEDED

## 2024-05-13 ENCOUNTER — HOSPITAL ENCOUNTER (OUTPATIENT)
Dept: RADIOLOGY | Facility: HOSPITAL | Age: 64
Discharge: HOME | End: 2024-05-13
Payer: COMMERCIAL

## 2024-05-13 DIAGNOSIS — K82.8 OTHER SPECIFIED DISEASES OF GALLBLADDER: ICD-10-CM

## 2024-05-13 PROCEDURE — 76705 ECHO EXAM OF ABDOMEN: CPT | Performed by: RADIOLOGY

## 2024-05-13 PROCEDURE — 76705 ECHO EXAM OF ABDOMEN: CPT

## 2024-05-29 ENCOUNTER — LAB (OUTPATIENT)
Dept: LAB | Facility: LAB | Age: 64
End: 2024-05-29
Payer: COMMERCIAL

## 2024-05-29 DIAGNOSIS — K80.20 CALCULUS OF GALLBLADDER WITHOUT CHOLECYSTITIS WITHOUT OBSTRUCTION: Primary | ICD-10-CM

## 2024-05-29 LAB
ALBUMIN SERPL BCP-MCNC: 4.6 G/DL (ref 3.4–5)
ALP SERPL-CCNC: 63 U/L (ref 33–136)
ALT SERPL W P-5'-P-CCNC: 15 U/L (ref 7–45)
ANION GAP SERPL CALC-SCNC: 11 MMOL/L (ref 10–20)
AST SERPL W P-5'-P-CCNC: 16 U/L (ref 9–39)
BILIRUB SERPL-MCNC: 0.6 MG/DL (ref 0–1.2)
BUN SERPL-MCNC: 20 MG/DL (ref 6–23)
CALCIUM SERPL-MCNC: 9.7 MG/DL (ref 8.6–10.3)
CHLORIDE SERPL-SCNC: 105 MMOL/L (ref 98–107)
CO2 SERPL-SCNC: 30 MMOL/L (ref 21–32)
CREAT SERPL-MCNC: 0.9 MG/DL (ref 0.5–1.05)
EGFRCR SERPLBLD CKD-EPI 2021: 71 ML/MIN/1.73M*2
GLUCOSE SERPL-MCNC: 79 MG/DL (ref 74–99)
POTASSIUM SERPL-SCNC: 4.3 MMOL/L (ref 3.5–5.3)
PROT SERPL-MCNC: 7 G/DL (ref 6.4–8.2)
SODIUM SERPL-SCNC: 142 MMOL/L (ref 136–145)

## 2024-05-29 PROCEDURE — 36415 COLL VENOUS BLD VENIPUNCTURE: CPT

## 2024-05-29 PROCEDURE — 80053 COMPREHEN METABOLIC PANEL: CPT

## 2024-06-07 ENCOUNTER — OFFICE VISIT (OUTPATIENT)
Dept: SURGERY | Facility: CLINIC | Age: 64
End: 2024-06-07
Payer: COMMERCIAL

## 2024-06-07 VITALS
BODY MASS INDEX: 30.05 KG/M2 | HEIGHT: 64 IN | SYSTOLIC BLOOD PRESSURE: 135 MMHG | DIASTOLIC BLOOD PRESSURE: 86 MMHG | WEIGHT: 176 LBS | HEART RATE: 91 BPM | OXYGEN SATURATION: 96 %

## 2024-06-07 DIAGNOSIS — R10.11 RUQ PAIN: ICD-10-CM

## 2024-06-07 DIAGNOSIS — Z01.818 PRE-OPERATIVE EXAM: ICD-10-CM

## 2024-06-07 DIAGNOSIS — K80.20 CALCULUS OF GALLBLADDER WITHOUT CHOLECYSTITIS WITHOUT OBSTRUCTION: ICD-10-CM

## 2024-06-07 PROCEDURE — 99205 OFFICE O/P NEW HI 60 MIN: CPT | Performed by: SURGERY

## 2024-06-07 NOTE — PROGRESS NOTES
General Surgery History and Physical    Referring Provider:  MARICRUZ Mckinney      Chief Complaint:  Gallstones    History of Present Illness:  This is a 64 y.o. female who presents with gallstones.  She reports that for the past couple of months she has had persistent intermittent diarrhea.  She sometimes has blood in her stool with this.  She also reports that about once a week she is woken up from her sleep at night with right upper quadrant pain.  This then goes away pretty quickly and she is able to return to sleep.  She has not noticed either the diarrhea or the right upper quadrant pain being associated with any specific foods.  She denies any fevers, chills, nausea, or vomiting.  She also denies any jaundice or icterus.  However, this prompted a CT scan demonstrating sludge in the gallbladder, which then led to an ultrasound that demonstrated gallstones and a mildly dilated common bile duct.    Past Medical History:  Diarrhea  Constipation  Obesity  Gastroesophageal reflux disease    Past Surgical History:  Colonoscopy in 2022  Tonsillectomy  Dilation and curettage    Medications:  Dymista  Pepcid  Ferrous sulfate  Meloxicam  Vascepa    Allergies:  Nabumetone  Amoxicillin (diarrhea)    Family History:  Hypothyroidism  Lung cancer  Hypertension    Social History:  .  .  Denies tobacco, alcohol, and illicits.       Review of Systems:  A complete 12 point review of systems was performed and is negative except as noted in the history of present illness.      Vital Signs:  Vitals:    06/07/24 1047   BP: 135/86   Pulse: 91   SpO2: 96%          Physical Exam:  General: No acute distress. Sitting up in bed.   Neuro: Alert and oriented ×3. Follows commands.  Head: Atraumatic  Eyes: Pupils equal reactive to light. Extraocular motions intact.  Ears: Hears normal speaking voice.  Mouth, Nose, Throat: Mucous membranes moist.  Normal dentition.  Neck: Supple. No appreciable  masses.  Chest: No crepitus.  No appreciable scars.  Heart: Regular rate and rhythm.  Lung: Clear to auscultation bilaterally.  Vascular: No carotid bruits.  Palpable radial pulses bilaterally.  Abdomen: Soft. Nondistended. Nontender.  No appreciable hernias or scars.  Musculoskeletal: Moves all extremities.  Normal range of motion.  Lymphatic: No palpable lymph nodes.  Skin: No rashes or lesions.  Psychological: Normal affect      Laboratory Values:  CBC:   Lab Results   Component Value Date    WBC 5.8 02/01/2023    RBC 4.58 02/01/2023    HGB 13.1 02/01/2023    HCT 40.7 02/01/2023     02/01/2023       RFP:   Lab Results   Component Value Date     05/29/2024    K 4.3 05/29/2024     05/29/2024    CO2 30 05/29/2024    BUN 20 05/29/2024    CREATININE 0.90 05/29/2024    CALCIUM 9.7 05/29/2024        LFTs:   Lab Results   Component Value Date    PROT 7.0 05/29/2024    ALBUMIN 4.6 05/29/2024    BILITOT 0.6 05/29/2024    ALKPHOS 63 05/29/2024    AST 16 05/29/2024    ALT 15 05/29/2024            Imaging:  I have personally reviewed the images and the radiologist's report.  CT abdomen pelvis: Sludge in the gallbladder    Right upper quadrant ultrasound: Gallstones with mild dilation of the common bile duct      Assessment:  This is a 64 y.o. female who presents with a couple months of chronic diarrhea as well as intermittent right upper quadrant pain.  Imaging demonstrates gallstones with mild dilation of the common bile duct.  We discussed that her right upper quadrant pain as well as the mild dilation of the common bile duct is likely related to the gallstones, and I therefore recommend a laparoscopic cholecystectomy.  We discussed that there is a small chance that this is also the source of her diarrhea, but this would not be a common cause of diarrhea.  We discussed that if the diarrhea persists after cholecystectomy, she should follow-up with her gastroenterologist to discuss a repeat colonoscopy  given changes in symptoms since her last colonoscopy even though it was normal.  We also discussed fiber supplementation and probiotics.      Plan:  -- We will plan for a laparoscopic cholecystectomy.  -- We will plan for surgery to be performed as an outpatient.  -- We will obtain preoperative labwork including CBC, RFP, Magnesium, LFTs, INR, and Type and Screen.  -- We will obtain a preoperative  EKG.  -- Avoid eating fatty food pre-operatively.  -- We will apply Dermabond, so the patient may shower the day after surgery.  No swimming or tub soaks for 2 weeks post-operatively.  -- No heavy lifting for 4 weeks after surgery.        Jayy Mckeon MD  General Surgery  Office: [phone]  Fax:     [phone]  11:21 AM   06/07/24

## 2024-06-12 ENCOUNTER — APPOINTMENT (OUTPATIENT)
Dept: SURGERY | Facility: CLINIC | Age: 64
End: 2024-06-12
Payer: COMMERCIAL

## 2024-06-26 ENCOUNTER — HOSPITAL ENCOUNTER (OUTPATIENT)
Dept: CARDIOLOGY | Facility: HOSPITAL | Age: 64
Discharge: HOME | End: 2024-06-26
Payer: COMMERCIAL

## 2024-06-26 ENCOUNTER — LAB (OUTPATIENT)
Dept: LAB | Facility: LAB | Age: 64
End: 2024-06-26
Payer: COMMERCIAL

## 2024-06-26 DIAGNOSIS — K80.20 CALCULUS OF GALLBLADDER WITHOUT CHOLECYSTITIS WITHOUT OBSTRUCTION: ICD-10-CM

## 2024-06-26 DIAGNOSIS — R10.11 RUQ PAIN: ICD-10-CM

## 2024-06-26 DIAGNOSIS — Z01.818 PRE-OPERATIVE EXAM: ICD-10-CM

## 2024-06-26 LAB
ABO GROUP (TYPE) IN BLOOD: NORMAL
ALBUMIN SERPL BCP-MCNC: 4.5 G/DL (ref 3.4–5)
ALP SERPL-CCNC: 64 U/L (ref 33–136)
ALT SERPL W P-5'-P-CCNC: 16 U/L (ref 7–45)
ANION GAP SERPL CALC-SCNC: 15 MMOL/L (ref 10–20)
ANTIBODY SCREEN: NORMAL
AST SERPL W P-5'-P-CCNC: 18 U/L (ref 9–39)
ATRIAL RATE: 62 BPM
BILIRUB DIRECT SERPL-MCNC: 0.1 MG/DL (ref 0–0.3)
BILIRUB SERPL-MCNC: 0.5 MG/DL (ref 0–1.2)
BUN SERPL-MCNC: 23 MG/DL (ref 6–23)
CALCIUM SERPL-MCNC: 9.6 MG/DL (ref 8.6–10.3)
CHLORIDE SERPL-SCNC: 105 MMOL/L (ref 98–107)
CO2 SERPL-SCNC: 27 MMOL/L (ref 21–32)
CREAT SERPL-MCNC: 0.89 MG/DL (ref 0.5–1.05)
EGFRCR SERPLBLD CKD-EPI 2021: 72 ML/MIN/1.73M*2
ERYTHROCYTE [DISTWIDTH] IN BLOOD BY AUTOMATED COUNT: 11.9 % (ref 11.5–14.5)
GLUCOSE SERPL-MCNC: 80 MG/DL (ref 74–99)
HCT VFR BLD AUTO: 40.9 % (ref 36–46)
HGB BLD-MCNC: 12.8 G/DL (ref 12–16)
INR PPP: 1 (ref 0.9–1.1)
MAGNESIUM SERPL-MCNC: 2.1 MG/DL (ref 1.6–2.4)
MCH RBC QN AUTO: 28.8 PG (ref 26–34)
MCHC RBC AUTO-ENTMCNC: 31.3 G/DL (ref 32–36)
MCV RBC AUTO: 92 FL (ref 80–100)
NRBC BLD-RTO: 0 /100 WBCS (ref 0–0)
P AXIS: 28 DEGREES
P OFFSET: 185 MS
P ONSET: 139 MS
PHOSPHATE SERPL-MCNC: 3.6 MG/DL (ref 2.5–4.9)
PLATELET # BLD AUTO: 319 X10*3/UL (ref 150–450)
POTASSIUM SERPL-SCNC: 4.6 MMOL/L (ref 3.5–5.3)
PR INTERVAL: 166 MS
PROT SERPL-MCNC: 6.6 G/DL (ref 6.4–8.2)
PROTHROMBIN TIME: 10.7 SECONDS (ref 9.8–12.8)
Q ONSET: 222 MS
QRS COUNT: 9 BEATS
QRS DURATION: 74 MS
QT INTERVAL: 400 MS
QTC CALCULATION(BAZETT): 406 MS
QTC FREDERICIA: 404 MS
R AXIS: 5 DEGREES
RBC # BLD AUTO: 4.44 X10*6/UL (ref 4–5.2)
RH FACTOR (ANTIGEN D): NORMAL
SODIUM SERPL-SCNC: 142 MMOL/L (ref 136–145)
T AXIS: 31 DEGREES
T OFFSET: 422 MS
VENTRICULAR RATE: 62 BPM
WBC # BLD AUTO: 6.5 X10*3/UL (ref 4.4–11.3)

## 2024-06-26 PROCEDURE — 85027 COMPLETE CBC AUTOMATED: CPT

## 2024-06-26 PROCEDURE — 86901 BLOOD TYPING SEROLOGIC RH(D): CPT

## 2024-06-26 PROCEDURE — 86900 BLOOD TYPING SEROLOGIC ABO: CPT

## 2024-06-26 PROCEDURE — 86850 RBC ANTIBODY SCREEN: CPT

## 2024-06-26 PROCEDURE — 82248 BILIRUBIN DIRECT: CPT

## 2024-06-26 PROCEDURE — 93005 ELECTROCARDIOGRAM TRACING: CPT

## 2024-06-26 PROCEDURE — 84100 ASSAY OF PHOSPHORUS: CPT

## 2024-06-26 PROCEDURE — 80053 COMPREHEN METABOLIC PANEL: CPT

## 2024-06-26 PROCEDURE — 85610 PROTHROMBIN TIME: CPT

## 2024-06-26 PROCEDURE — 36415 COLL VENOUS BLD VENIPUNCTURE: CPT

## 2024-06-26 PROCEDURE — 83735 ASSAY OF MAGNESIUM: CPT

## 2024-07-08 ENCOUNTER — ANESTHESIA EVENT (OUTPATIENT)
Dept: OPERATING ROOM | Facility: HOSPITAL | Age: 64
End: 2024-07-08
Payer: COMMERCIAL

## 2024-07-09 ENCOUNTER — HOSPITAL ENCOUNTER (OUTPATIENT)
Facility: HOSPITAL | Age: 64
Setting detail: OUTPATIENT SURGERY
Discharge: HOME | End: 2024-07-09
Attending: SURGERY | Admitting: SURGERY
Payer: COMMERCIAL

## 2024-07-09 ENCOUNTER — PHARMACY VISIT (OUTPATIENT)
Dept: PHARMACY | Facility: CLINIC | Age: 64
End: 2024-07-09
Payer: COMMERCIAL

## 2024-07-09 ENCOUNTER — ANESTHESIA (OUTPATIENT)
Dept: OPERATING ROOM | Facility: HOSPITAL | Age: 64
End: 2024-07-09
Payer: COMMERCIAL

## 2024-07-09 VITALS
OXYGEN SATURATION: 97 % | RESPIRATION RATE: 15 BRPM | SYSTOLIC BLOOD PRESSURE: 136 MMHG | HEART RATE: 88 BPM | TEMPERATURE: 97.9 F | WEIGHT: 170.42 LBS | BODY MASS INDEX: 29.09 KG/M2 | DIASTOLIC BLOOD PRESSURE: 67 MMHG | HEIGHT: 64 IN

## 2024-07-09 DIAGNOSIS — K80.20 CALCULUS OF GALLBLADDER WITHOUT CHOLECYSTITIS WITHOUT OBSTRUCTION: Primary | ICD-10-CM

## 2024-07-09 LAB
ABO GROUP (TYPE) IN BLOOD: NORMAL
RH FACTOR (ANTIGEN D): NORMAL

## 2024-07-09 PROCEDURE — A47562 PR LAP,CHOLECYSTECTOMY: Performed by: NURSE ANESTHETIST, CERTIFIED REGISTERED

## 2024-07-09 PROCEDURE — A47562 PR LAP,CHOLECYSTECTOMY: Performed by: STUDENT IN AN ORGANIZED HEALTH CARE EDUCATION/TRAINING PROGRAM

## 2024-07-09 PROCEDURE — 2500000004 HC RX 250 GENERAL PHARMACY W/ HCPCS (ALT 636 FOR OP/ED): Performed by: ANESTHESIOLOGY

## 2024-07-09 PROCEDURE — 2500000005 HC RX 250 GENERAL PHARMACY W/O HCPCS: Performed by: NURSE ANESTHETIST, CERTIFIED REGISTERED

## 2024-07-09 PROCEDURE — 7100000001 HC RECOVERY ROOM TIME - INITIAL BASE CHARGE: Performed by: SURGERY

## 2024-07-09 PROCEDURE — 47563 LAPARO CHOLECYSTECTOMY/GRAPH: CPT | Performed by: PHYSICIAN ASSISTANT

## 2024-07-09 PROCEDURE — 7100000010 HC PHASE TWO TIME - EACH INCREMENTAL 1 MINUTE: Performed by: SURGERY

## 2024-07-09 PROCEDURE — RXMED WILLOW AMBULATORY MEDICATION CHARGE

## 2024-07-09 PROCEDURE — 3600000003 HC OR TIME - INITIAL BASE CHARGE - PROCEDURE LEVEL THREE: Performed by: SURGERY

## 2024-07-09 PROCEDURE — 88304 TISSUE EXAM BY PATHOLOGIST: CPT | Mod: TC,GEALAB | Performed by: SURGERY

## 2024-07-09 PROCEDURE — 47563 LAPARO CHOLECYSTECTOMY/GRAPH: CPT | Performed by: SURGERY

## 2024-07-09 PROCEDURE — 3700000002 HC GENERAL ANESTHESIA TIME - EACH INCREMENTAL 1 MINUTE: Performed by: SURGERY

## 2024-07-09 PROCEDURE — 2500000005 HC RX 250 GENERAL PHARMACY W/O HCPCS: Performed by: SURGERY

## 2024-07-09 PROCEDURE — 3700000001 HC GENERAL ANESTHESIA TIME - INITIAL BASE CHARGE: Performed by: SURGERY

## 2024-07-09 PROCEDURE — 2500000001 HC RX 250 WO HCPCS SELF ADMINISTERED DRUGS (ALT 637 FOR MEDICARE OP): Performed by: SURGERY

## 2024-07-09 PROCEDURE — 2780000003 HC OR 278 NO HCPCS: Performed by: SURGERY

## 2024-07-09 PROCEDURE — 7100000002 HC RECOVERY ROOM TIME - EACH INCREMENTAL 1 MINUTE: Performed by: SURGERY

## 2024-07-09 PROCEDURE — 3600000008 HC OR TIME - EACH INCREMENTAL 1 MINUTE - PROCEDURE LEVEL THREE: Performed by: SURGERY

## 2024-07-09 PROCEDURE — 2720000007 HC OR 272 NO HCPCS: Performed by: SURGERY

## 2024-07-09 PROCEDURE — 7100000009 HC PHASE TWO TIME - INITIAL BASE CHARGE: Performed by: SURGERY

## 2024-07-09 PROCEDURE — 96372 THER/PROPH/DIAG INJ SC/IM: CPT | Performed by: SURGERY

## 2024-07-09 PROCEDURE — C1889 IMPLANT/INSERT DEVICE, NOC: HCPCS | Performed by: SURGERY

## 2024-07-09 PROCEDURE — 2500000004 HC RX 250 GENERAL PHARMACY W/ HCPCS (ALT 636 FOR OP/ED): Performed by: NURSE ANESTHETIST, CERTIFIED REGISTERED

## 2024-07-09 PROCEDURE — 36415 COLL VENOUS BLD VENIPUNCTURE: CPT | Performed by: SURGERY

## 2024-07-09 PROCEDURE — 2500000004 HC RX 250 GENERAL PHARMACY W/ HCPCS (ALT 636 FOR OP/ED): Performed by: SURGERY

## 2024-07-09 RX ORDER — ACETAMINOPHEN 325 MG/1
650 TABLET ORAL ONCE
Status: COMPLETED | OUTPATIENT
Start: 2024-07-09 | End: 2024-07-09

## 2024-07-09 RX ORDER — PHENYLEPHRINE HCL IN 0.9% NACL 0.4MG/10ML
SYRINGE (ML) INTRAVENOUS AS NEEDED
Status: DISCONTINUED | OUTPATIENT
Start: 2024-07-09 | End: 2024-07-09

## 2024-07-09 RX ORDER — ACETAMINOPHEN 325 MG/1
650 TABLET ORAL EVERY 4 HOURS PRN
Status: DISCONTINUED | OUTPATIENT
Start: 2024-07-09 | End: 2024-07-09 | Stop reason: HOSPADM

## 2024-07-09 RX ORDER — LIDOCAINE HCL/PF 100 MG/5ML
SYRINGE (ML) INTRAVENOUS AS NEEDED
Status: DISCONTINUED | OUTPATIENT
Start: 2024-07-09 | End: 2024-07-09

## 2024-07-09 RX ORDER — GABAPENTIN 300 MG/1
300 CAPSULE ORAL ONCE
Status: COMPLETED | OUTPATIENT
Start: 2024-07-09 | End: 2024-07-09

## 2024-07-09 RX ORDER — MIDAZOLAM HYDROCHLORIDE 1 MG/ML
INJECTION INTRAMUSCULAR; INTRAVENOUS AS NEEDED
Status: DISCONTINUED | OUTPATIENT
Start: 2024-07-09 | End: 2024-07-09

## 2024-07-09 RX ORDER — POLYETHYLENE GLYCOL 3350 17 G/17G
17 POWDER, FOR SOLUTION ORAL DAILY
Qty: 238 G | Refills: 0 | Status: SHIPPED | OUTPATIENT
Start: 2024-07-09 | End: 2024-07-26 | Stop reason: ALTCHOICE

## 2024-07-09 RX ORDER — INDOCYANINE GREEN AND WATER 25 MG
2.5 KIT INJECTION ONCE
Status: COMPLETED | OUTPATIENT
Start: 2024-07-09 | End: 2024-07-09

## 2024-07-09 RX ORDER — IBUPROFEN 600 MG/1
600 TABLET ORAL EVERY 6 HOURS
Qty: 10 TABLET | Refills: 0 | Status: SHIPPED | OUTPATIENT
Start: 2024-07-09 | End: 2024-07-12

## 2024-07-09 RX ORDER — PROPOFOL 10 MG/ML
INJECTION, EMULSION INTRAVENOUS AS NEEDED
Status: DISCONTINUED | OUTPATIENT
Start: 2024-07-09 | End: 2024-07-09

## 2024-07-09 RX ORDER — ENOXAPARIN SODIUM 100 MG/ML
30 INJECTION SUBCUTANEOUS ONCE
Status: COMPLETED | OUTPATIENT
Start: 2024-07-09 | End: 2024-07-09

## 2024-07-09 RX ORDER — BUPIVACAINE HYDROCHLORIDE 5 MG/ML
INJECTION, SOLUTION EPIDURAL; INTRACAUDAL AS NEEDED
Status: DISCONTINUED | OUTPATIENT
Start: 2024-07-09 | End: 2024-07-09 | Stop reason: HOSPADM

## 2024-07-09 RX ORDER — OXYCODONE HYDROCHLORIDE 5 MG/1
5 TABLET ORAL ONCE
Status: CANCELLED | OUTPATIENT
Start: 2024-07-09

## 2024-07-09 RX ORDER — OXYCODONE HYDROCHLORIDE 5 MG/1
5 TABLET ORAL ONCE
Status: COMPLETED | OUTPATIENT
Start: 2024-07-09 | End: 2024-07-09

## 2024-07-09 RX ORDER — SODIUM CHLORIDE, SODIUM LACTATE, POTASSIUM CHLORIDE, CALCIUM CHLORIDE 600; 310; 30; 20 MG/100ML; MG/100ML; MG/100ML; MG/100ML
100 INJECTION, SOLUTION INTRAVENOUS CONTINUOUS
Status: DISCONTINUED | OUTPATIENT
Start: 2024-07-09 | End: 2024-07-09 | Stop reason: HOSPADM

## 2024-07-09 RX ORDER — FENTANYL CITRATE 50 UG/ML
INJECTION, SOLUTION INTRAMUSCULAR; INTRAVENOUS AS NEEDED
Status: DISCONTINUED | OUTPATIENT
Start: 2024-07-09 | End: 2024-07-09

## 2024-07-09 RX ORDER — ONDANSETRON HYDROCHLORIDE 2 MG/ML
INJECTION, SOLUTION INTRAVENOUS AS NEEDED
Status: DISCONTINUED | OUTPATIENT
Start: 2024-07-09 | End: 2024-07-09

## 2024-07-09 RX ORDER — OXYCODONE HYDROCHLORIDE 5 MG/1
5 TABLET ORAL EVERY 6 HOURS PRN
Qty: 5 TABLET | Refills: 0 | Status: SHIPPED | OUTPATIENT
Start: 2024-07-09 | End: 2024-07-26 | Stop reason: ALTCHOICE

## 2024-07-09 RX ORDER — ALBUTEROL SULFATE 0.83 MG/ML
2.5 SOLUTION RESPIRATORY (INHALATION) ONCE AS NEEDED
Status: DISCONTINUED | OUTPATIENT
Start: 2024-07-09 | End: 2024-07-09 | Stop reason: HOSPADM

## 2024-07-09 RX ORDER — CEFAZOLIN 1 G/1
INJECTION, POWDER, FOR SOLUTION INTRAVENOUS AS NEEDED
Status: DISCONTINUED | OUTPATIENT
Start: 2024-07-09 | End: 2024-07-09

## 2024-07-09 RX ORDER — ACETAMINOPHEN 325 MG/1
650 TABLET ORAL EVERY 6 HOURS
Qty: 20 TABLET | Refills: 0 | Status: SHIPPED | OUTPATIENT
Start: 2024-07-09 | End: 2024-07-12

## 2024-07-09 RX ORDER — KETOROLAC TROMETHAMINE 30 MG/ML
INJECTION, SOLUTION INTRAMUSCULAR; INTRAVENOUS AS NEEDED
Status: DISCONTINUED | OUTPATIENT
Start: 2024-07-09 | End: 2024-07-09

## 2024-07-09 RX ORDER — ONDANSETRON 4 MG/1
4 TABLET, ORALLY DISINTEGRATING ORAL EVERY 6 HOURS PRN
Qty: 15 TABLET | Refills: 0 | Status: SHIPPED | OUTPATIENT
Start: 2024-07-09 | End: 2024-07-26 | Stop reason: ALTCHOICE

## 2024-07-09 RX ORDER — ONDANSETRON HYDROCHLORIDE 2 MG/ML
8 INJECTION, SOLUTION INTRAVENOUS ONCE
Status: DISCONTINUED | OUTPATIENT
Start: 2024-07-09 | End: 2024-07-09 | Stop reason: HOSPADM

## 2024-07-09 RX ORDER — ROCURONIUM BROMIDE 10 MG/ML
INJECTION, SOLUTION INTRAVENOUS AS NEEDED
Status: DISCONTINUED | OUTPATIENT
Start: 2024-07-09 | End: 2024-07-09

## 2024-07-09 SDOH — HEALTH STABILITY: MENTAL HEALTH: CURRENT SMOKER: 0

## 2024-07-09 ASSESSMENT — PAIN SCALES - GENERAL
PAINLEVEL_OUTOF10: 4
PAINLEVEL_OUTOF10: 3
PAINLEVEL_OUTOF10: 3

## 2024-07-09 ASSESSMENT — PAIN DESCRIPTION - LOCATION
LOCATION: ABDOMEN

## 2024-07-09 NOTE — ANESTHESIA PREPROCEDURE EVALUATION
Patient: Fifi Avila    Procedure Information       Date/Time: 07/09/24 1040    Procedure: CHOLECYSTECTOMY LAPAROSCOPY - Laparoscopy Cholecystectomy    Location: [facility] OR 07 / [facility]    Surgeons: Minh Mckeon MD            Relevant Problems   Anesthesia (within normal limits)      Cardiac   (+) Hypercholesterolemia      Pulmonary (within normal limits)      Neuro (within normal limits)      GI (within normal limits)      /Renal (within normal limits)      Liver   (+) Calculus of gallbladder without cholecystitis without obstruction      Endocrine (within normal limits)      Hematology (within normal limits)      Musculoskeletal   (+) DJD (degenerative joint disease), cervical      HEENT (within normal limits)      ID (within normal limits)      Skin (within normal limits)      GYN (within normal limits)      Gastrointestinal and Abdominal   (+) Laryngopharyngeal reflux       Clinical information reviewed:   Tobacco  Allergies  Meds   Med Hx  Surg Hx   Fam Hx          NPO Detail:  No data recorded     Physical Exam    Airway  Mallampati: III  TM distance: >3 FB  Neck ROM: full     Cardiovascular    Dental - normal exam     Pulmonary    Abdominal            Anesthesia Plan    History of general anesthesia?: yes  History of complications of general anesthesia?: no    ASA 2     general     The patient is not a current smoker.    intravenous induction   Postoperative administration of opioids is intended.  Trial extubation is planned.  Anesthetic plan and risks discussed with patient.  Use of blood products discussed with patient who consented to blood products.    Plan discussed with CRNA.

## 2024-07-09 NOTE — H&P
"History Of Present Illness  Fifi Avila is a 64 y.o. female presenting with gallstones.     Past Medical History  Past Medical History:   Diagnosis Date    Astigmatism of both eyes     Cholelithiasis     Class 1 obesity with body mass index (BMI) of 30.0 to 30.9 in adult 06/07/2024    DJD (degenerative joint disease), cervical     Dry eyes     Intermittent diarrhea     Persistant    Laryngopharyngeal reflux (LPR)     Positive colorectal cancer screening using Cologuard test 02/24/2023    Rhinitis, allergic        Surgical History  Past Surgical History:   Procedure Laterality Date    COLONOSCOPY      DILATION AND CURETTAGE OF UTERUS  12/13/2022    TONSILLECTOMY  09/01/2022        Social History  She reports that she has never smoked. She has never used smokeless tobacco. She reports that she does not drink alcohol and does not use drugs.    Family History  Family History   Problem Relation Name Age of Onset    Thyroid disease Mother          Allergies  Nabumetone, Amoxicillin-pot clavulanate, and Nickel    Review of Systems     Physical Exam     Last Recorded Vitals  Blood pressure 155/83, pulse 78, temperature 36.3 °C (97.3 °F), resp. rate 16, height 1.626 m (5' 4\"), weight 77.3 kg (170 lb 6.7 oz), SpO2 97%.    Relevant Results        gallstones     Assessment/Plan   Principal Problem:    Calculus of gallbladder without cholecystitis without obstruction      cholecystectomy       I spent 5 minutes in the professional and overall care of this patient.      Minh Mckeon MD    "

## 2024-07-09 NOTE — OP NOTE
CHOLECYSTECTOMY LAPAROSCOPY     Operative Date: 07/09/24  Patient's Name: Fifi Avila  Patient's YOB: 1960  Patient's MRN: [id number]  Patient's Age: 64 y.o.  Operating Room Location: [facility]  Patient's ASA: II  Patient's Estimated Blood Loss: 10 mL        PREOPERATIVE DIAGNOSIS:   Chronic cholecystitis        POSTOPERATIVE DIAGNOSIS:   Chronic cholecystitis        OPERATION/PROCEDURE:   Laparoscopic cholecystectomy with firefly cholangiography        SURGEON:   Minh Mckeon MD.         ASSISTANT:   JOSHUA Judd    No surgical resident was available to assist.  No hospital-employed assistant was available to assist.  The PA assisted with opening, exposure, dissection, and closure.            INDICATIONS:   This is a 64 y.o. female who presented with chronic cholecystitis.  We discussed that her diarrhea likely would not change with cholecystectomy, but many of her symptoms were classic for chronic cholecystitis.        OPERATION:   The reasons for, benefits to, and risks of surgery were discussed with the patient.  The risks included, but not limited to, bleeding, infection, persistent pain, injury to surrounding structures, and postoperative abscess.  The patient appeared to understand and consented for surgery.  She was therefore brought back to the operating room and placed on the operating room table in the supine position.  Her abdomen was prepped and draped in a standard fashion.       We accessed the abdomen in the left upper quadrant with a Veress needle.  We then insufflated the pressure.  After insufflating to pressure, we made a 5 mm incision periumbilically.  We then entered the abdomen with a 5 mm optical view port.  We then removed the Veress needle.  We then performed our bilateral transversus abdominis plane block, instilling 15 mL of half percent Marcaine into each transversus abdominis plane under direct visualization.  We then placed our  three 5 mm working ports in the standard right subcostal location.  We then placed the patient in reverse Trendelenburg positioning with right side up.  We then grasped the dome of the gallbladder and lifted it over the liver in the standard fashion.  We then inspected the infundibulum and opened the peritoneum over the infundibulum extending it medially and laterally to the reflective edges of the liver.  This gave us more mobility on the infundibulum.  We then began dissecting on the triangle of Calot.  We eventually elevated the infundibulum off of the cystic plate of the liver.  We had two tubular structures entering the infundibulum with nothing returning to the liver.  We had a window between these structures and could see nothing but liver on the other side.  We then activated our firefly camera.  She had previously received indocyanine green in the preoperative area.  We could then see the liver lighting up green, liquid trace the common bile duct lighting up drain from the liver down to the duodenum.  We could then see 1 tubular structure tracking from the common bile duct up to the infundibulum lighting up green.  The other tubular structure did not light up green.  We thus obtained our critical view of safety, identifying our cystic duct and cystic artery.  We then clipped across the artery with two clips down and one clip up.  We then clipped across the duct with three clips down and one clip up.  We then transected each structure between our clips.  We then dissected the gallbladder off of the gallbladder fossa in the standard bottom-up technique.  As we did this, we could see a posterior branch cystic artery coming up to the gallbladder, which we clipped across.  We then completed our dissection of the gallbladder off the gallbladder fossa.  We then used an Endo Catch bag to extract the gallbladder through the epigastric 5 mm port site after upsizing.  We then closed the fascia of this port site with  an interrupted figure-of-eight 0 Vicryl suture on a Roverto-Sim suture passer.  We then removed the remaining ports under direct visualization.  We desufflated the abdomen.  We closed the skin of all of our port sites with subcuticular 4-0 Vicryl suture.  Dermabond was applied over top.         DISPOSITION:   The patient tolerated the procedure well.  There were no immediate complications.  She will be brought to the postanesthesia care unit. From there, she will be discharged home with outpatient followup with me.      I was present and scrubbed in for the entire procedure.      CPT Code:  06124       Operating Room Staff:  Anesthesiologist: Mehrdad Oliver DO  CRNA: JESSE Moore  Anesthesia Break User: JESSE Cannon  Circulator: Komal Buitrago RN  Relief Circulator: Verena Armendariz RN  Scrub Person: Misty Bocanegra RN; JANAE Cruz MD  General Surgery  Office: [phone]  Fax:     [phone]  1:25 PM  07/09/24

## 2024-07-09 NOTE — ANESTHESIA PROCEDURE NOTES
Airway  Date/Time: 7/9/2024 12:06 PM  Urgency: elective    Airway not difficult    Staffing  Performed: CRNA   Authorized by: Mehrdad Oliver DO    Performed by: LINDA Moore-JD  Patient location during procedure: OR    Indications and Patient Condition  Indications for airway management: anesthesia  Spontaneous Ventilation: absent  Sedation level: deep  Preoxygenated: yes  Patient position: sniffing  MILS maintained throughout  Mask difficulty assessment: 1 - vent by mask  Planned trial extubation    Final Airway Details  Final airway type: endotracheal airway      Successful airway: ETT  Cuffed: yes   Successful intubation technique: video laryngoscopy (Goldsmith)  Facilitating devices/methods: intubating stylet  Endotracheal tube insertion site: oral  Blade size: #3  Cormack-Lehane Classification: grade I - full view of glottis  Placement verified by: chest auscultation   Cuff volume (mL): 8  Measured from: teeth  ETT to teeth (cm): 21  Number of attempts at approach: 1  Number of other approaches attempted: 0

## 2024-07-09 NOTE — DISCHARGE INSTRUCTIONS
PATIENT INSTRUCTIONS  Cholecystectomy    FOLLOW-UP: Please call the office after being discharged to make a follow up appointment in 2-3 weeks. Call your physician immediately if you have any fevers greater than 103 degrees Fahrenheit, drainage from your wound that is not clear or looks infected, persistent bleeding, increasing abdominal pain,  or persistent nausea/vomiting.     WOUND CARE INSTRUCTIONS: Incisions are closed with absorbable sutures and covered with glue. Glue will fall off in about 2 weeks. If the wound becomes bright red and painful or starts to drain infected material that is not clear, please contact your physician immediately. If the wound is mildly pink and has a thick firm ridge underneath it, this is normal and is referred to as a healing ridge. This will resolve over the next 4-6 weeks. You are welcome to shower the day after surgery. Wash abdomen with warm, soapy water using your hand or gentle wash cloth. Pat dry. Do not submerge incisions in a bath tub or swimming pool for 2 weeks following surgery.    DRAIN: If your surgeon discharges you with a drain, you will need to empty it when it becomes 1/3 full. Empty it into a graduated specimen cup and record total outputs for the day. Bring these recordings to your follow up appointment. Strip your drain once a day to prevent clogging. Your drain will likely be removed in 1 week in office. The drain fluid should turn from a pink, watery fluid to a straw yellow, watery fluid. If it turns green, brown, dark red and thick or develops a bad odor, call our office immediately.     DIET: You may eat any foods that you can tolerate. We recommend following a bland, easily digestible diet for the first few days after surgery until your appetite improves. It is a good idea to eat a high fiber diet, and take in plenty of fluids to prevent constipation. Take Miralax daily if experiencing constipation after surgery until stools are a pudding like consistency  and easy to pass.     ACTIVITY: You are encouraged to cough and take deep breaths or use the incentive spirometry that was provided. This will help prevent respiratory complications and low grade fevers post-operatively. You may want to hug a pillow when coughing and sneezing to add additional support to the surgical area which will decrease pain during these times. You should not lift more than 10 pounds for 4 weeks after surgery as it could put you at increased risk for a post-operative hernia. We encourage frequent ambulation and getting out of bed as much as possible while you recover to improve your recovery process. Avoid strenuous activity for 4 weeks, which includes exercise that increases the heart rate, breathing rate, or makes you break a sweat.     MEDICATIONS: Plan to take Tylenol and Ibuprofen (if your physician approves) every 6 hours for the first week after surgery. Alternatively, you can alternate these two medications every three hours for the first week. You will be provided a short course of a narcotic medication to take for breakthrough pain as needed. You should not drive, make important decisions, or operate machinery when taking narcotic pain medication.    QUESTIONS: Please feel free to call your physician's office for any questions or concerns following surgery. Our office number is [phone].

## 2024-07-09 NOTE — ANESTHESIA POSTPROCEDURE EVALUATION
Patient: Fifi Avila    Procedure Summary       Date: 07/09/24 Room / Location: [facility] OR 07 / [facility]    Anesthesia Start: 1154 Anesthesia Stop: 1329    Procedure: CHOLECYSTECTOMY LAPAROSCOPY Diagnosis:       Calculus of gallbladder without cholecystitis without obstruction      (Calculus of gallbladder without cholecystitis without obstruction [K80.20])    Surgeons: Minh Mckeon MD Responsible Provider: Mehrdad Oliver DO    Anesthesia Type: general ASA Status: 2            Anesthesia Type: general    Vitals Value Taken Time   /83 07/09/24 1334   Temp 36.3 07/09/24 1334   Pulse 78 07/09/24 1334   Resp 16 07/09/24 1334   SpO2 97 07/09/24 1334       Anesthesia Post Evaluation    Patient location during evaluation: PACU  Patient participation: complete - patient participated  Level of consciousness: awake  Pain management: adequate  Multimodal analgesia pain management approach  Airway patency: patent  Two or more strategies used to mitigate risk of obstructive sleep apnea  Cardiovascular status: acceptable  Respiratory status: acceptable  Hydration status: acceptable  Postoperative Nausea and Vomiting: none        No notable events documented.

## 2024-07-19 LAB
LABORATORY COMMENT REPORT: NORMAL
PATH REPORT.FINAL DX SPEC: NORMAL
PATH REPORT.GROSS SPEC: NORMAL
PATH REPORT.RELEVANT HX SPEC: NORMAL
PATH REPORT.TOTAL CANCER: NORMAL
RESIDENT REVIEW: NORMAL

## 2024-07-26 ENCOUNTER — APPOINTMENT (OUTPATIENT)
Dept: SURGERY | Facility: CLINIC | Age: 64
End: 2024-07-26
Payer: COMMERCIAL

## 2024-07-26 VITALS — SYSTOLIC BLOOD PRESSURE: 172 MMHG | DIASTOLIC BLOOD PRESSURE: 95 MMHG | HEART RATE: 88 BPM | OXYGEN SATURATION: 96 %

## 2024-07-26 DIAGNOSIS — Z09 POSTOPERATIVE EXAMINATION: Primary | ICD-10-CM

## 2024-07-26 PROCEDURE — 99024 POSTOP FOLLOW-UP VISIT: CPT | Performed by: PHYSICIAN ASSISTANT

## 2024-07-26 PROCEDURE — 1160F RVW MEDS BY RX/DR IN RCRD: CPT | Performed by: PHYSICIAN ASSISTANT

## 2024-07-26 PROCEDURE — 1159F MED LIST DOCD IN RCRD: CPT | Performed by: PHYSICIAN ASSISTANT

## 2024-07-26 PROCEDURE — 1036F TOBACCO NON-USER: CPT | Performed by: PHYSICIAN ASSISTANT

## 2024-07-26 NOTE — PROGRESS NOTES
General Surgery Post Operative Follow Up     Jeanie Avila presents to the clinic 2 weeks following a laparoscopic cholecystectomy. Pain has been improving over the past week, will have some sharp pains with certain movements, but overall getting better. Eating and drinking without issue. Denies constipation. She continues to have frequent stooling, up to 5 times a day, denies watery stools and is similar to stooling prior to surgery. Ambulatory at home. Incisions healing well.     Objective   BP (!) 172/95   Pulse 88   SpO2 96%     Physical Exam  Constitutional: No acute distress, sitting up in bed.  Neuro: Alert, oriented. Follows commands.   Eyes: EOMI. No scleral icterus. Conjunctiva pink.  ENT: MMM.   Heart: Regular rate.  Respiratory: No increased work of breathing or audible wheeze.  Abdomen: Soft, nondistended. Appropriately tender. Incisions clean, dry, intact.   MSK: Moves all extremities.  Vascular: Palpable pulses throughout, no pitting edema.  Skin: No rashes.   Psychological: Appropriate mood and behavior.       Assessment/Plan   This is a 64 y.o. year old female who presents for a post operative follow up 2 weeks following a laparoscopic cholecystectomy. Her frequent stooling experienced prior to surgery still persists, somewhat improved. Discussed if this continues to affect quality of life I would recommend following back up with GI. Otherwise doing well post operatively.     Plan:  -- Lifting restrictions: 2 more week(s) of no heavy lifting > 10 lbs   -- Add a daily fiber supplement   -- OK for swimming and tub soaks  -- Return to strenuous activity gradually and as tolerated  -- Regular diet  -- Follow up as needed    Discussed with Dr. Mckeon who is in agreement with plan.     Kathy Gardner PA-C

## 2024-09-25 ENCOUNTER — APPOINTMENT (OUTPATIENT)
Dept: PRIMARY CARE | Facility: CLINIC | Age: 64
End: 2024-09-25
Payer: COMMERCIAL

## 2024-09-25 VITALS
OXYGEN SATURATION: 97 % | WEIGHT: 174 LBS | HEIGHT: 64 IN | HEART RATE: 72 BPM | SYSTOLIC BLOOD PRESSURE: 136 MMHG | BODY MASS INDEX: 29.71 KG/M2 | DIASTOLIC BLOOD PRESSURE: 78 MMHG

## 2024-09-25 DIAGNOSIS — G89.29 CHRONIC FOOT PAIN, RIGHT: ICD-10-CM

## 2024-09-25 DIAGNOSIS — G89.29 CHRONIC HIP PAIN, LEFT: ICD-10-CM

## 2024-09-25 DIAGNOSIS — Z13.1 SCREENING FOR DIABETES MELLITUS: ICD-10-CM

## 2024-09-25 DIAGNOSIS — Z23 NEED FOR VACCINATION: ICD-10-CM

## 2024-09-25 DIAGNOSIS — D50.8 IRON DEFICIENCY ANEMIA SECONDARY TO INADEQUATE DIETARY IRON INTAKE: ICD-10-CM

## 2024-09-25 DIAGNOSIS — Z13.220 LIPID SCREENING: ICD-10-CM

## 2024-09-25 DIAGNOSIS — Z00.00 ANNUAL PHYSICAL EXAM: Primary | ICD-10-CM

## 2024-09-25 DIAGNOSIS — M25.552 CHRONIC HIP PAIN, LEFT: ICD-10-CM

## 2024-09-25 DIAGNOSIS — M79.671 CHRONIC FOOT PAIN, RIGHT: ICD-10-CM

## 2024-09-25 DIAGNOSIS — E55.9 VITAMIN D DEFICIENCY: ICD-10-CM

## 2024-09-25 DIAGNOSIS — Z12.4 SCREENING FOR CERVICAL CANCER: ICD-10-CM

## 2024-09-25 PROCEDURE — 99397 PER PM REEVAL EST PAT 65+ YR: CPT | Performed by: NURSE PRACTITIONER

## 2024-09-25 PROCEDURE — 87624 HPV HI-RISK TYP POOLED RSLT: CPT

## 2024-09-25 PROCEDURE — 90471 IMMUNIZATION ADMIN: CPT | Performed by: NURSE PRACTITIONER

## 2024-09-25 PROCEDURE — 90662 IIV NO PRSV INCREASED AG IM: CPT | Performed by: NURSE PRACTITIONER

## 2024-09-25 PROCEDURE — 90472 IMMUNIZATION ADMIN EACH ADD: CPT | Performed by: NURSE PRACTITIONER

## 2024-09-25 PROCEDURE — 1123F ACP DISCUSS/DSCN MKR DOCD: CPT | Performed by: NURSE PRACTITIONER

## 2024-09-25 PROCEDURE — 90677 PCV20 VACCINE IM: CPT | Performed by: NURSE PRACTITIONER

## 2024-09-25 PROCEDURE — 1160F RVW MEDS BY RX/DR IN RCRD: CPT | Performed by: NURSE PRACTITIONER

## 2024-09-25 PROCEDURE — 3008F BODY MASS INDEX DOCD: CPT | Performed by: NURSE PRACTITIONER

## 2024-09-25 PROCEDURE — 1036F TOBACCO NON-USER: CPT | Performed by: NURSE PRACTITIONER

## 2024-09-25 PROCEDURE — 1158F ADVNC CARE PLAN TLK DOCD: CPT | Performed by: NURSE PRACTITIONER

## 2024-09-25 PROCEDURE — 1159F MED LIST DOCD IN RCRD: CPT | Performed by: NURSE PRACTITIONER

## 2024-09-25 ASSESSMENT — ENCOUNTER SYMPTOMS
OCCASIONAL FEELINGS OF UNSTEADINESS: 0
DEPRESSION: 0
LOSS OF SENSATION IN FEET: 0

## 2024-09-25 NOTE — ASSESSMENT & PLAN NOTE
- Counseled on healthy diet and regular exercise  - Fall avoidance information provided  - Personalized prevention plan provided   - Mammogram UTD   - Colon and PAP are UTD  - Vaccines: flu and pneumonia vaccines today   - FBW ordered   - Pt agreeable to HEP C screening tests

## 2024-09-25 NOTE — PATIENT INSTRUCTIONS
Thank you for seeing me today Fifinigel Avila, it was a pleasure to see you again!    PAP today     Lab work ordered today.  Please have your blood drawn in the next 1-2 weeks.  You need to be FASTING for 12 hours prior to blood draw.  You may only have water.  Please contact your insurance company to ask about the best location to get blood drawn.  We will contact you with the results of your blood work and any necessary adjustments  to your plan of care, if you do not hear from us within 3-5 days of having your blood drawn, please call the office at [phone].    Flu and Pneumonia vaccines today     See podiatry for foot pain       For assistance with scheduling referrals or consultations, please call [phone] or [phone].    For laboratory, radiology, and other tests, please call [phone] ([phone] for pediatrics).   If you do not get results within 7-10 days, or you have any questions or concerns, please send a message, call the office ([phone]), or return to the office for a follow-up appointment.     For acute/sick visits, if you are unable to get an office visit, you can do a  On Demand Virtual Visit that is accessible via your My Chart account.  For emergencies, call 9-1-1 or go to the nearest Emergency Department.     Please schedule additional appointment(s) to address concern(s) not addressed today.    Please review prescription inserts and published information for possible adverse effects of all medications.     In general, results are discussed over the phone or via  [employer].     You can see your health information, review clinical summaries from office visits & test results online when you follow your health with MY  Chart, a personal health record.   To sign up go to [email or web address].   If you need assistance with signing up or trouble getting into your account call [employer] Patient Line 24/7 at [phone]         Ways to Help Prevent Falls at Home    Quick  Tips   ? Ask for help if you need it. Most people want to help!   ? Get up slowly after sitting or laying down   ? Wear a medical alert device or keep cell phone in your pocket   ? Use night lights, especially areas near a bathroom   ? Keep the items you use often within reach on a small stool or end table   ? Use an assistive device such as walker or cane, as directed by provider/physical therapy   ? Use a non-slip mat and grab bars in your bathroom. Look for home health sections for best options     Other Areas to Focus On   ? Exercise and nutrition: Regular exercise or taking a falls prevention class are great ways improve strength and balance. Don’t forget to stay hydrated and bring a snack!   ? Medicine side effects: Some medicines can make you sleepy or dizzy, which could cause a fall. Ask your healthcare provider about the side effects your medicines could cause. Be sure to let them know if you take any vitamins or supplements as well.   ? Tripping hazards: Remove items you could trip on, such as loose mats, rugs, cords, and clutter. Wear closed toe shoes with rubber soles.   ? Health and wellness: Get regular checkups with your healthcare provider, plus routine vision and hearing screenings. Talk with your healthcare provider about:   o Your medicines and the possible side effects - bring them in a bag if that is easier!   o Problems with balance or feeling dizzy   o Ways to promote bone health, such as Vitamin D and calcium supplements   o Questions or concerns about falling     *Ask your healthcare team if you have questions     [facility], 2022     RTC ANNUALLY AND AS NEEDED

## 2024-09-25 NOTE — PROGRESS NOTES
"Subjective   Reason for Visit: Fifi Avila is an 64 y.o. female here for a CPE     Chief Complaint   Patient presents with    Annual Exam     Fifi Avila is a 64 y.o. female is here today for a CPE. Patient reports right hip pain. Patient states she is also having pain in both feet. Her previous podiatist retired.   168/94       HPI  Fifi  is a 65 yo female presenting today for Annual CPE     Pt states she had her gallbladder removed in July   Doing really well     Pt states her LEFT anterior hip has been bothering her when she goes up steps  Has hx of \"impingement syndrome\"   States she was sitting on the floor a few weeks ago helping her dgtr make something and that's when her hip started to bother her  Has been taking NSAIDs to see if that helps    She is also having issues with her RIGHT foot, saw podiatry, but he retired (Dr. Medley), needs referral for new podiatrist    #CPE   Occupation: FT as a      Do you take any herbs or supplements that were not prescribed by a doctor? Iron, Vitamin D     Colon Cancer Screening: UTD, due 2032    LMP: menopause age 49   PAP: DUE   Mammogram: UTD, due April 2025  GYN: None     Fasting blood work: Due   HIV/HEP C Screening: Due   Last eye exam: 2023  Last dental Exam: 2024  Exercise: regularly   Mood: no concerns   Sleep: no concerns   Vaccines: flu vaccine today       Health Maintenance Due   Topic Date Due    MMR Vaccines (1 of 1 - Standard series) Never done    Hepatitis C Screening  Never done    Cervical Cancer Screening  09/19/2014    RSV Pregnant patients and/or  patients aged 60+ years (1 - 1-dose 60+ series) Never done    COVID-19 Vaccine (4 - 2023-24 season) 09/01/2024       Allergies   Allergen Reactions    Nabumetone GI bleeding, GI intolerance, Other and Unknown     Caused blood in stool    Amoxicillin-Pot Clavulanate Diarrhea    Azithromycin GI Upset    Nickel Rash               Patient Care Team:  LINDA Mckinney-ISRAEL as PCP - " "General (Family Medicine)     Review of Systems  ROS was completed and all systems are negative with the exception of what was noted in the the HPI.       Objective   Vitals:  /78   Pulse 72   Ht 1.626 m (5' 4\")   Wt 78.9 kg (174 lb)   SpO2 97%   BMI 29.87 kg/m²       Current Outpatient Medications   Medication Instructions    acetaminophen (ACETAMINOPHEN EXTRA STRENGTH) 500 mg, oral, Every 6 hours PRN    azelastine-fluticasone (Dymista) 137-50 mcg/spray nasal spray SPRAY 1 SPRAY INTO EACH NOSTRIL TWICE A DAY FOR 30 DAYS    cholecalciferol (VITAMIN D-3) 50 mcg, oral, Daily    famotidine (PEPCID) 20 mg, oral, 2 times daily    ferrous sulfate, 325 mg ferrous sulfate, tablet 1 tablet, oral, Daily, Take with food.    fexofenadine ODT (ALLEGRA ODT) 15 mg, oral, Daily    icosapent ethyL (Vascepa) 1 gram capsule     meloxicam (MOBIC) 15 mg, oral, Daily, Every other day       Physical Exam  Vitals reviewed.   Eyes:      Conjunctiva/sclera: Conjunctivae normal.   Cardiovascular:      Pulses: Normal pulses.      Heart sounds: Normal heart sounds.   Pulmonary:      Effort: Pulmonary effort is normal.      Breath sounds: Normal breath sounds.   Abdominal:      General: Bowel sounds are normal.   Neurological:      Mental Status: She is alert and oriented to person, place, and time.   Psychiatric:         Mood and Affect: Mood normal.         Assessment & Plan  Annual physical exam  - Counseled on healthy diet and regular exercise  - Fall avoidance information provided  - Personalized prevention plan provided   - Mammogram UTD   - Colon and PAP are UTD  - Vaccines: flu and pneumonia vaccines today   - FBW ordered   - Pt agreeable to HEP C screening tests        Chronic hip pain, left  Stretching  Ice  NSAIDs        Need for vaccination    Orders:    Flu vaccine, trivalent, preservative free, HIGH-DOSE, age 65y+ (Fluzone)    Pneumococcal conjugate vaccine, 20-valent (PREVNAR 20)    Lipid screening    Orders:    Lipid " Panel; Future    Screening for diabetes mellitus    Orders:    Comprehensive Metabolic Panel; Future    Vitamin D deficiency    Orders:    Vitamin D 25-Hydroxy,Total (for eval of Vitamin D levels); Future    Iron deficiency anemia secondary to inadequate dietary iron intake    Orders:    Ferritin; Future    Iron and TIBC; Future    Screening for cervical cancer    Orders:    THINPREP PAP TEST    Chronic foot pain, right  See Podiatry   Orders:    Referral to Podiatry; Future         Patient was identified as a fall risk. Risk prevention instructions provided.

## 2024-10-06 LAB
CYTOLOGY CMNT CVX/VAG CYTO-IMP: NORMAL
HPV HR 12 DNA GENITAL QL NAA+PROBE: NEGATIVE
HPV HR GENOTYPES PNL CVX NAA+PROBE: NEGATIVE
HPV16 DNA SPEC QL NAA+PROBE: NEGATIVE
HPV18 DNA SPEC QL NAA+PROBE: NEGATIVE
LAB AP HPV GENOTYPE QUESTION: YES
LAB AP HPV HR: NORMAL
LABORATORY COMMENT REPORT: NORMAL
PATH REPORT.TOTAL CANCER: NORMAL

## 2024-10-08 ENCOUNTER — LAB (OUTPATIENT)
Dept: LAB | Facility: LAB | Age: 64
End: 2024-10-08
Payer: COMMERCIAL

## 2024-10-08 DIAGNOSIS — Z13.1 SCREENING FOR DIABETES MELLITUS: ICD-10-CM

## 2024-10-08 DIAGNOSIS — E55.9 VITAMIN D DEFICIENCY: ICD-10-CM

## 2024-10-08 DIAGNOSIS — D50.8 IRON DEFICIENCY ANEMIA SECONDARY TO INADEQUATE DIETARY IRON INTAKE: ICD-10-CM

## 2024-10-08 DIAGNOSIS — Z13.220 LIPID SCREENING: ICD-10-CM

## 2024-10-08 LAB
25(OH)D3 SERPL-MCNC: 53 NG/ML (ref 30–100)
ALBUMIN SERPL BCP-MCNC: 4.5 G/DL (ref 3.4–5)
ALP SERPL-CCNC: 79 U/L (ref 33–136)
ALT SERPL W P-5'-P-CCNC: 19 U/L (ref 7–45)
ANION GAP SERPL CALC-SCNC: 13 MMOL/L (ref 10–20)
AST SERPL W P-5'-P-CCNC: 20 U/L (ref 9–39)
BILIRUB SERPL-MCNC: 0.5 MG/DL (ref 0–1.2)
BUN SERPL-MCNC: 24 MG/DL (ref 6–23)
CALCIUM SERPL-MCNC: 9.5 MG/DL (ref 8.6–10.3)
CHLORIDE SERPL-SCNC: 103 MMOL/L (ref 98–107)
CHOLEST SERPL-MCNC: 276 MG/DL (ref 0–199)
CHOLESTEROL/HDL RATIO: 5
CO2 SERPL-SCNC: 27 MMOL/L (ref 21–32)
CREAT SERPL-MCNC: 0.89 MG/DL (ref 0.5–1.05)
EGFRCR SERPLBLD CKD-EPI 2021: 72 ML/MIN/1.73M*2
FERRITIN SERPL-MCNC: 132 NG/ML (ref 8–150)
GLUCOSE SERPL-MCNC: 88 MG/DL (ref 74–99)
HDLC SERPL-MCNC: 55.2 MG/DL
IRON SATN MFR SERPL: 28 % (ref 25–45)
IRON SERPL-MCNC: 92 UG/DL (ref 35–150)
LDLC SERPL CALC-MCNC: 174 MG/DL
NON HDL CHOLESTEROL: 221 MG/DL (ref 0–149)
POTASSIUM SERPL-SCNC: 4.3 MMOL/L (ref 3.5–5.3)
PROT SERPL-MCNC: 6.9 G/DL (ref 6.4–8.2)
SODIUM SERPL-SCNC: 139 MMOL/L (ref 136–145)
TIBC SERPL-MCNC: 332 UG/DL (ref 240–445)
TRIGL SERPL-MCNC: 233 MG/DL (ref 0–149)
UIBC SERPL-MCNC: 240 UG/DL (ref 110–370)
VLDL: 47 MG/DL (ref 0–40)

## 2024-10-08 PROCEDURE — 83540 ASSAY OF IRON: CPT

## 2024-10-08 PROCEDURE — 83550 IRON BINDING TEST: CPT

## 2024-10-08 PROCEDURE — 82728 ASSAY OF FERRITIN: CPT

## 2024-10-08 PROCEDURE — 80061 LIPID PANEL: CPT

## 2024-10-08 PROCEDURE — 36415 COLL VENOUS BLD VENIPUNCTURE: CPT

## 2024-10-08 PROCEDURE — 82306 VITAMIN D 25 HYDROXY: CPT

## 2024-10-08 PROCEDURE — 80053 COMPREHEN METABOLIC PANEL: CPT

## 2024-10-09 DIAGNOSIS — E78.00 HYPERCHOLESTEROLEMIA: Primary | Chronic | ICD-10-CM

## 2024-10-09 RX ORDER — ROSUVASTATIN CALCIUM 20 MG/1
20 TABLET, COATED ORAL DAILY
Qty: 100 TABLET | Refills: 3 | Status: SHIPPED | OUTPATIENT
Start: 2024-10-09 | End: 2024-10-09

## 2024-10-09 RX ORDER — ROSUVASTATIN CALCIUM 20 MG/1
20 TABLET, COATED ORAL DAILY
Qty: 100 TABLET | Refills: 3 | Status: SHIPPED | OUTPATIENT
Start: 2024-10-09 | End: 2025-11-13

## 2024-10-09 NOTE — RESULT ENCOUNTER NOTE
Fifi Avila    I have reviewed all of your blood work and it looks fine.     Your kidney and liver function were normal.    Your cholesterol has gotten worse and I do recommend you starting medication to help lower this to prevent cardiovascular events.  I have sent in a Rx to your pharmacy, [facility] in [place].  Please take this medication in the evening every night.       If you have any questions, please feel free to call my office.    Take Care,   Umu

## 2024-10-09 NOTE — RESULT ENCOUNTER NOTE
Fifi Avila    I have reviewed the results of your PAP test and it was normal     No changes to your plan of care as we discussed in the office.     If you have any questions, please feel free to call my office.    Take Care,   Umu

## 2024-10-11 ENCOUNTER — TELEPHONE (OUTPATIENT)
Dept: PRIMARY CARE | Facility: CLINIC | Age: 64
End: 2024-10-11
Payer: COMMERCIAL

## 2024-11-13 ENCOUNTER — APPOINTMENT (OUTPATIENT)
Dept: PODIATRY | Facility: CLINIC | Age: 64
End: 2024-11-13
Payer: COMMERCIAL

## 2024-11-13 DIAGNOSIS — M79.671 CHRONIC FOOT PAIN, RIGHT: ICD-10-CM

## 2024-11-13 DIAGNOSIS — M19.071 ARTHRITIS OF RIGHT MIDFOOT: Primary | ICD-10-CM

## 2024-11-13 DIAGNOSIS — G89.29 CHRONIC FOOT PAIN, RIGHT: ICD-10-CM

## 2024-11-13 PROCEDURE — 99203 OFFICE O/P NEW LOW 30 MIN: CPT | Performed by: PODIATRIST

## 2024-11-13 PROCEDURE — 1036F TOBACCO NON-USER: CPT | Performed by: PODIATRIST

## 2024-11-13 PROCEDURE — 1159F MED LIST DOCD IN RCRD: CPT | Performed by: PODIATRIST

## 2024-11-13 PROCEDURE — 1123F ACP DISCUSS/DSCN MKR DOCD: CPT | Performed by: PODIATRIST

## 2024-11-13 PROCEDURE — 1160F RVW MEDS BY RX/DR IN RCRD: CPT | Performed by: PODIATRIST

## 2024-11-13 NOTE — PROGRESS NOTES
This is a 64 y.o. female new patient for R foot pain    History of Present Illness:   Patient states they are here for foot pain  States she was seeing another pod who recently retired.   Has left foot pain but has been relieved with inserts  Right foot continues to have pain with walking    Past Medical History  Past Medical History:   Diagnosis Date    Astigmatism of both eyes     Cholelithiasis     Class 1 obesity with body mass index (BMI) of 30.0 to 30.9 in adult 06/07/2024    DJD (degenerative joint disease), cervical     Dry eyes     Intermittent diarrhea     Persistant    Laryngopharyngeal reflux (LPR)     Positive colorectal cancer screening using Cologuard test 02/24/2023    Rhinitis, allergic        Medications and Allergies have been reviewed.    Review Of Systems:  GENERAL: No weight loss, malaise or fevers.  HEENT: Negative for frequent or significant headaches,   RESPIRATORY: Negative for cough, wheezing or shortness of breath.  CARDIOVASCULAR: Negative for chest pain, leg swelling or palpitations.    Physical Exam:  Patient is a pleasant, cooperative, well developed 64 y.o.  adult female. The patient is alert and oriented to time, place and person.   Patient has normal affect and mood.    Examination of Both Lower Extremities:   Objective:   Vasc: DP and PT pulses are palpable bilateral.  CFT is less than 3 seconds bilateral.  Skin temperature is warm to cool proximal to distal bilateral.      Neuro: Vibratory, light touch and proprioception are intact bilateral.     Derm: Nails 1-5 bilateral are intact.  Skin is supple with normal texture and turgor noted.  Webspaces are clean, dry and intact bilateral.  There are no hyperkeratoses, ulcerations, verruca or other lesions noted.      Ortho: Muscle strength is 5/5 for all pedal groups tested.  Right pain to 5th MTPJ. No edema, erythema or ecchymosis.     1. Arthritis of right midfoot        2. Chronic foot pain, right  Referral to Podiatry         Patient exam and eval  Discussed causes, symptoms, aggravating factors and treatment options  Recommend stiff supportive shoe gear  Shoes that do not twist or bend  Discussed ice, nsaids, sushant souza/biofreeze  Patient to follow up if no improvement noted.   Pt in agreement to plan  All questions answered    Lalitha Davis DPM  [phone]  Option [address]  Fax: [phone]

## 2024-11-18 DIAGNOSIS — J30.9 ALLERGIC RHINITIS, UNSPECIFIED SEASONALITY, UNSPECIFIED TRIGGER: ICD-10-CM

## 2024-11-18 RX ORDER — AZELASTINE HYDROCHLORIDE, FLUTICASONE PROPIONATE 137; 50 UG/1; UG/1
SPRAY, METERED NASAL
Qty: 1 EACH | Refills: 1 | Status: SHIPPED | OUTPATIENT
Start: 2024-11-18

## 2024-11-21 ENCOUNTER — OFFICE VISIT (OUTPATIENT)
Dept: PRIMARY CARE | Facility: CLINIC | Age: 64
End: 2024-11-21
Payer: COMMERCIAL

## 2024-11-21 VITALS
BODY MASS INDEX: 30.05 KG/M2 | HEIGHT: 64 IN | WEIGHT: 176 LBS | HEART RATE: 80 BPM | OXYGEN SATURATION: 97 % | TEMPERATURE: 97.7 F

## 2024-11-21 DIAGNOSIS — B34.9 VIRAL SYNDROME: Primary | ICD-10-CM

## 2024-11-21 PROCEDURE — 1036F TOBACCO NON-USER: CPT | Performed by: NURSE PRACTITIONER

## 2024-11-21 PROCEDURE — 99212 OFFICE O/P EST SF 10 MIN: CPT | Performed by: NURSE PRACTITIONER

## 2024-11-21 PROCEDURE — 3008F BODY MASS INDEX DOCD: CPT | Performed by: NURSE PRACTITIONER

## 2024-11-21 PROCEDURE — 1123F ACP DISCUSS/DSCN MKR DOCD: CPT | Performed by: NURSE PRACTITIONER

## 2024-11-21 PROCEDURE — 1159F MED LIST DOCD IN RCRD: CPT | Performed by: NURSE PRACTITIONER

## 2024-11-21 PROCEDURE — 1160F RVW MEDS BY RX/DR IN RCRD: CPT | Performed by: NURSE PRACTITIONER

## 2024-11-21 NOTE — PATIENT INSTRUCTIONS
Viral syndrome  A virus is a germ that causes infections, such as the common cold, bronchiolitis, tonsillitis, Covid and the flu.  There are hundreds of different viruses.      Symptoms of a virus can include: fever, head and body aches, fatigue, cough, congestion, sore throat, and sneezing.  These symptoms can last for 7-14 days, often seeing improvement in about 10 days.     There are a number of treatments for viral sickness:  ~ Get plenty of rest  ~ Take OTC medicine, such as Tylenol, Ibuprofen (Motrin or Advil) or Aleve  ~ Take OTC Vitamin C, Zinc and/or Echinacea (ECZ Marlo)   ~ Drink plenty of water to thin the mucus and break up congestion   ~ Steamy showers   ~ Humidifier in bedroom

## 2024-11-21 NOTE — PROGRESS NOTES
"Fifi Avila is a 64 y.o. female who presents today for a same day sick visit    Chief Complaint   Patient presents with    Sick Visit     Fifi Avila is coming in today as a SDS for Sinus congestion, loss of voice this has been going on since last week. Tired, headache, ear pain. Patient has been taking allergy meds with no help. Patient also has complaint about her rosuvastatin.        Symptoms: Sinus congestion, loss of voice, headache   Pt denies fevers/chills  Denies ear pain   Symptom onset has been acute for a time period of 1 week(s).   Severity is described as mild-moderate.   Course of her symptoms over time is acute.  Has taken: cough drops, Allegra      Allergies   Allergen Reactions    Nabumetone GI bleeding, GI intolerance, Other and Unknown     Caused blood in stool    Amoxicillin-Pot Clavulanate Diarrhea    Azithromycin GI Upset    Nickel Rash       Review of Systems  ROS was completed and all systems are negative with the exception of what was noted in the the HPI.       Objective   Vitals:  Pulse 80   Temp 36.5 °C (97.7 °F)   Ht 1.626 m (5' 4\")   Wt 79.8 kg (176 lb)   SpO2 97%   BMI 30.21 kg/m²       Current Outpatient Medications   Medication Instructions    acetaminophen (ACETAMINOPHEN EXTRA STRENGTH) 500 mg, Every 6 hours PRN    azelastine-fluticasone (Dymista) 137-50 mcg/spray nasal spray SPRAY 1 SPRAY INTO EACH NOSTRIL TWICE A DAY FOR 30 DAYS    cholecalciferol (VITAMIN D-3) 50 mcg, Daily    famotidine (PEPCID) 20 mg, oral, 2 times daily    ferrous sulfate, 325 mg ferrous sulfate, tablet 1 tablet, oral, Daily, Take with food.    fexofenadine ODT (ALLEGRA ODT) 15 mg, Daily    icosapent ethyL (Vascepa) 1 gram capsule     meloxicam (MOBIC) 15 mg, oral, Daily, Every other day    rosuvastatin (CRESTOR) 20 mg, oral, Daily         Physical Exam  HENT:      Right Ear: Tympanic membrane normal.      Left Ear: Tympanic membrane normal.      Mouth/Throat:      Mouth: Mucous membranes are moist. "      Pharynx: Postnasal drip present. No pharyngeal swelling or oropharyngeal exudate.   Eyes:      Conjunctiva/sclera: Conjunctivae normal.   Neurological:      Mental Status: She is alert.         Assessment & Plan  Viral syndrome  A virus is a germ that causes infections, such as the common cold, bronchiolitis, tonsillitis, Covid and the flu.  There are hundreds of different viruses.      Symptoms of a virus can include: fever, head and body aches, fatigue, cough, congestion, sore throat, and sneezing.  These symptoms can last for 7-14 days, often seeing improvement in about 10 days.     There are a number of treatments for viral sickness:  ~ Get plenty of rest  ~ Take OTC medicine, such as Tylenol, Ibuprofen (Motrin or Advil) or Aleve  ~ Take OTC Vitamin C, Zinc and/or Echinacea (ECZ Marlo)   ~ Drink plenty of water to thin the mucus and break up congestion   ~ Steamy showers   ~ Humidifier in bedroom

## 2025-02-14 ENCOUNTER — OFFICE VISIT (OUTPATIENT)
Dept: PRIMARY CARE | Facility: CLINIC | Age: 65
End: 2025-02-14
Payer: COMMERCIAL

## 2025-02-14 VITALS — HEART RATE: 68 BPM | HEIGHT: 64 IN | OXYGEN SATURATION: 98 % | WEIGHT: 181 LBS | BODY MASS INDEX: 30.9 KG/M2

## 2025-02-14 DIAGNOSIS — M54.2 CERVICAL PAIN: ICD-10-CM

## 2025-02-14 DIAGNOSIS — H65.192 ACUTE MUCOID OTITIS MEDIA OF LEFT EAR: Primary | ICD-10-CM

## 2025-02-14 PROCEDURE — 3008F BODY MASS INDEX DOCD: CPT

## 2025-02-14 PROCEDURE — 1123F ACP DISCUSS/DSCN MKR DOCD: CPT

## 2025-02-14 PROCEDURE — 99213 OFFICE O/P EST LOW 20 MIN: CPT

## 2025-02-14 PROCEDURE — 1159F MED LIST DOCD IN RCRD: CPT

## 2025-02-14 PROCEDURE — 1036F TOBACCO NON-USER: CPT

## 2025-02-14 RX ORDER — DOXYCYCLINE 100 MG/1
100 CAPSULE ORAL 2 TIMES DAILY
Qty: 20 CAPSULE | Refills: 0 | Status: SHIPPED | OUTPATIENT
Start: 2025-02-14 | End: 2025-02-24

## 2025-02-14 ASSESSMENT — ENCOUNTER SYMPTOMS
VOICE CHANGE: 0
TROUBLE SWALLOWING: 0
NECK STIFFNESS: 1
ARTHRALGIAS: 1
WEAKNESS: 0
SWOLLEN GLANDS: 0
VOMITING: 0
PAIN: 1
FEVER: 0
NECK PAIN: 1
FATIGUE: 0
NAUSEA: 0

## 2025-02-14 NOTE — PROGRESS NOTES
"Subjective   Patient ID: Fifi Avila is a 64 y.o. female who presents for Pain (Pain in left jaw area, possibly coming from neck and shoulder./157/88).    Pain  This is a new problem. The current episode started in the past 7 days. The problem occurs constantly. The problem is unchanged. The context of the pain is unknown. Pain location: left jaw. The pain is medium. The symptoms are aggravated by exercise. Pertinent negatives include no fatigue, fever, nausea, swollen glands, vomiting or weakness. (Has dental appointment 2/25) Past treatments include acetaminophen, heat pack and cold pack. The treatment provided no relief. There is no history of chronic back pain or rheumatic disease.        Review of Systems   Constitutional:  Negative for fatigue and fever.   HENT:  Negative for ear discharge, ear pain, tinnitus, trouble swallowing and voice change.    Gastrointestinal:  Negative for nausea and vomiting.   Musculoskeletal:  Positive for arthralgias, neck pain and neck stiffness.   Neurological:  Negative for weakness.       Objective   Pulse 68   Ht 1.626 m (5' 4\")   Wt 82.1 kg (181 lb)   SpO2 98%   BMI 31.07 kg/m²     Physical Exam  Vitals reviewed.   Constitutional:       Appearance: Normal appearance.   HENT:      Right Ear: Tympanic membrane and ear canal normal.      Left Ear: Ear canal normal. A middle ear effusion is present. Tympanic membrane is injected, erythematous and bulging.   Neurological:      Mental Status: She is alert.         Assessment/Plan   Fifi was seen today for pain.  Diagnoses and all orders for this visit:  Acute mucoid otitis media of left ear  -     doxycycline (Monodox) 100 mg capsule; Take 1 capsule (100 mg) by mouth 2 times a day for 10 days. Take with at least 8 ounces (large glass) of water, do not lie down for 30 minutes after  Cervical pain  Comments:  printed appropriate stretching for pt, rec physiclal therapy           " I called spoke w pt   Aware of results and dr recommendations   No further questions or concerns at this time

## 2025-02-15 DIAGNOSIS — J30.9 ALLERGIC RHINITIS, UNSPECIFIED SEASONALITY, UNSPECIFIED TRIGGER: ICD-10-CM

## 2025-02-17 RX ORDER — AZELASTINE HYDROCHLORIDE, FLUTICASONE PROPIONATE 137; 50 UG/1; UG/1
SPRAY, METERED NASAL
Qty: 23 ML | Refills: 2 | Status: SHIPPED | OUTPATIENT
Start: 2025-02-17

## 2025-02-18 ENCOUNTER — PATIENT MESSAGE (OUTPATIENT)
Dept: PRIMARY CARE | Facility: CLINIC | Age: 65
End: 2025-02-18
Payer: COMMERCIAL

## 2025-02-18 DIAGNOSIS — H65.192 ACUTE MUCOID OTITIS MEDIA OF LEFT EAR: Primary | ICD-10-CM

## 2025-02-18 RX ORDER — METHYLPREDNISOLONE 4 MG/1
TABLET ORAL
Qty: 21 TABLET | Refills: 0 | Status: SHIPPED | OUTPATIENT
Start: 2025-02-18 | End: 2025-02-24

## 2025-03-05 ENCOUNTER — OFFICE VISIT (OUTPATIENT)
Dept: PRIMARY CARE | Facility: CLINIC | Age: 65
End: 2025-03-05
Payer: COMMERCIAL

## 2025-03-05 VITALS
WEIGHT: 183.6 LBS | TEMPERATURE: 97.9 F | HEIGHT: 64 IN | HEART RATE: 84 BPM | BODY MASS INDEX: 31.34 KG/M2 | OXYGEN SATURATION: 98 %

## 2025-03-05 DIAGNOSIS — M26.609 TMJ (TEMPOROMANDIBULAR JOINT SYNDROME): Primary | ICD-10-CM

## 2025-03-05 PROCEDURE — 1159F MED LIST DOCD IN RCRD: CPT | Performed by: NURSE PRACTITIONER

## 2025-03-05 PROCEDURE — 1036F TOBACCO NON-USER: CPT | Performed by: NURSE PRACTITIONER

## 2025-03-05 PROCEDURE — 3008F BODY MASS INDEX DOCD: CPT | Performed by: NURSE PRACTITIONER

## 2025-03-05 PROCEDURE — 99213 OFFICE O/P EST LOW 20 MIN: CPT | Performed by: NURSE PRACTITIONER

## 2025-03-05 PROCEDURE — 1158F ADVNC CARE PLAN TLK DOCD: CPT | Performed by: NURSE PRACTITIONER

## 2025-03-05 PROCEDURE — 1160F RVW MEDS BY RX/DR IN RCRD: CPT | Performed by: NURSE PRACTITIONER

## 2025-03-05 PROCEDURE — 1123F ACP DISCUSS/DSCN MKR DOCD: CPT | Performed by: NURSE PRACTITIONER

## 2025-03-05 RX ORDER — PREDNISONE 20 MG/1
TABLET ORAL
Qty: 21 TABLET | Refills: 0 | Status: SHIPPED | OUTPATIENT
Start: 2025-03-05

## 2025-03-05 ASSESSMENT — PATIENT HEALTH QUESTIONNAIRE - PHQ9
SUM OF ALL RESPONSES TO PHQ9 QUESTIONS 1 AND 2: 0
2. FEELING DOWN, DEPRESSED OR HOPELESS: NOT AT ALL
1. LITTLE INTEREST OR PLEASURE IN DOING THINGS: NOT AT ALL

## 2025-03-05 NOTE — PATIENT INSTRUCTIONS
Thank you for seeing me today Fifi Avila, it was a pleasure to see you again!    TMJ (temporomandibular joint syndrome)  Avoid hard/chewy foods  Soft foods only  Rx Prednisone taper  Ibuprofen 600-800 mg every 8 hours  Tylenol 975 mg every 4-6 hours   Ice pack   Orders:    predniSONE (Deltasone) 20 mg tablet; Take 3 tabs (60mg) daily for 5 days, then take 2 tabs (40mg) daily for 2 days, then take 1 tab (20mg) daily for 2 days.    For assistance with scheduling referrals or consultations, please call [phone] or [phone].    For laboratory, radiology, and other tests, please call [phone] ([phone] for pediatrics).   For laboratory locations, please visit [email or web address]   If you do not get results within 7-10 days, or you have any questions or concerns, please send a message, call the office ([phone]), or return to the office for a follow-up appointment.     For acute/sick visits, if you are unable to get an office visit, you can do a  On Demand Virtual Visit that is accessible via your My Chart account.  For emergencies, call 9-1-1 or go to the nearest Emergency Department.     Please schedule additional appointment(s) to address concern(s) not addressed today.    Please review prescription inserts and published information for possible adverse effects of all medications.     In general, results are discussed over the phone or via  [employer].     You can see your health information, review clinical summaries from office visits & test results online when you follow your health with MY  Chart, a personal health record.   To sign up go to [email or web address].   If you need assistance with signing up or trouble getting into your account call [employer] Patient Line 24/7 at [phone]     [facility] SEPTEMBER AS SCHEDULED     ~Umu Ha NP

## 2025-03-05 NOTE — PROGRESS NOTES
"Fifi Avila is a 64 y.o. female who presents today for a same day acute sick visit    Chief Complaint   Patient presents with    Sick Visit     Fifi Avila is coming in today as a SDS for jaw and ear pain. Patient states this has been going on for 6 weeks. She has been on antibiotics, steroids and even saw a dentist. She said nothing is helping and when she chews she hears a weird crunching of her jaw.        Symptoms: Left sided jaw pain, \"crunchy\" sound when chewing  Was seen in Feb and tx'd for ear infection with oral antibiotic  Saw her dentist last week and was told her mandible was \"tight\" and was told to put compresses on it  She still feels ear fullness      Allergies   Allergen Reactions    Nabumetone GI bleeding, GI intolerance, Other and Unknown     Caused blood in stool    Amoxicillin-Pot Clavulanate Diarrhea    Azithromycin GI Upset    Nickel Rash       Review of Systems  ROS was completed and all systems are negative with the exception of what was noted in the the HPI.     Objective   Vitals:  Pulse 84   Temp 36.6 °C (97.9 °F)   Ht 1.626 m (5' 4\")   Wt 83.3 kg (183 lb 9.6 oz)   SpO2 98%   BMI 31.51 kg/m²       Current Outpatient Medications   Medication Instructions    acetaminophen (ACETAMINOPHEN EXTRA STRENGTH) 500 mg, Every 6 hours PRN    azelastine-fluticasone (Dymista) 137-50 mcg/spray nasal spray USE 1 SPRAY IN EACH NOSTRIL TWICE A DAY FOR 30 DAYS.    cholecalciferol (VITAMIN D-3) 50 mcg, Daily    famotidine (PEPCID) 20 mg, oral, 2 times daily    ferrous sulfate, 325 mg ferrous sulfate, tablet 1 tablet, oral, Daily, Take with food.    fexofenadine ODT (ALLEGRA ODT) 15 mg, Daily    icosapent ethyL (Vascepa) 1 gram capsule     meloxicam (MOBIC) 15 mg, oral, Daily, Every other day    predniSONE (Deltasone) 20 mg tablet Take 3 tabs (60mg) daily for 5 days, then take 2 tabs (40mg) daily for 2 days, then take 1 tab (20mg) daily for 2 days.    rosuvastatin (CRESTOR) 20 mg, oral, Daily "         Physical Exam  HENT:      Right Ear: Tympanic membrane normal. There is no impacted cerumen.      Left Ear: Tympanic membrane normal. There is no impacted cerumen.      Mouth/Throat:      Mouth: Mucous membranes are moist.      Dentition: Normal dentition.      Comments: + clicking with opening jaw         Assessment & Plan  TMJ (temporomandibular joint syndrome)  Avoid hard/chewy foods  Soft foods only  Rx Prednisone taper  Ibuprofen 600-800 mg every 8 hours  Tylenol 975 mg every 4-6 hours   Ice pack   Orders:    predniSONE (Deltasone) 20 mg tablet; Take 3 tabs (60mg) daily for 5 days, then take 2 tabs (40mg) daily for 2 days, then take 1 tab (20mg) daily for 2 days.

## 2025-04-17 DIAGNOSIS — Z00.00 ENCOUNTER FOR GENERAL ADULT MEDICAL EXAMINATION WITHOUT ABNORMAL FINDINGS: ICD-10-CM

## 2025-04-17 RX ORDER — FERROUS SULFATE 325(65) MG
TABLET ORAL
Qty: 90 TABLET | Refills: 0 | Status: SHIPPED | OUTPATIENT
Start: 2025-04-17

## 2025-04-21 ENCOUNTER — TELEPHONE (OUTPATIENT)
Dept: DERMATOLOGY | Facility: CLINIC | Age: 65
End: 2025-04-21
Payer: COMMERCIAL

## 2025-04-21 NOTE — TELEPHONE ENCOUNTER
A message was left for the patient to call [facility] desk to schedule a sooner with Dr. Ramos.

## 2025-05-16 DIAGNOSIS — K21.9 LARYNGOPHARYNGEAL REFLUX: ICD-10-CM

## 2025-05-16 RX ORDER — FAMOTIDINE 20 MG/1
20 TABLET, FILM COATED ORAL 2 TIMES DAILY
Qty: 180 TABLET | Refills: 3 | Status: SHIPPED | OUTPATIENT
Start: 2025-05-16

## 2025-05-23 ENCOUNTER — OFFICE VISIT (OUTPATIENT)
Dept: PRIMARY CARE | Facility: CLINIC | Age: 65
End: 2025-05-23
Payer: COMMERCIAL

## 2025-05-23 VITALS
OXYGEN SATURATION: 97 % | HEART RATE: 82 BPM | HEIGHT: 64 IN | DIASTOLIC BLOOD PRESSURE: 84 MMHG | SYSTOLIC BLOOD PRESSURE: 138 MMHG | BODY MASS INDEX: 32.1 KG/M2 | WEIGHT: 188 LBS

## 2025-05-23 DIAGNOSIS — H93.8X2 EAR PRESSURE, LEFT: Primary | ICD-10-CM

## 2025-05-23 PROCEDURE — 99212 OFFICE O/P EST SF 10 MIN: CPT | Performed by: NURSE PRACTITIONER

## 2025-05-23 PROCEDURE — 3008F BODY MASS INDEX DOCD: CPT | Performed by: NURSE PRACTITIONER

## 2025-05-23 PROCEDURE — 1159F MED LIST DOCD IN RCRD: CPT | Performed by: NURSE PRACTITIONER

## 2025-05-23 PROCEDURE — 1160F RVW MEDS BY RX/DR IN RCRD: CPT | Performed by: NURSE PRACTITIONER

## 2025-05-23 PROCEDURE — 1036F TOBACCO NON-USER: CPT | Performed by: NURSE PRACTITIONER

## 2025-05-23 ASSESSMENT — PATIENT HEALTH QUESTIONNAIRE - PHQ9
2. FEELING DOWN, DEPRESSED OR HOPELESS: NOT AT ALL
1. LITTLE INTEREST OR PLEASURE IN DOING THINGS: NOT AT ALL
SUM OF ALL RESPONSES TO PHQ9 QUESTIONS 1 AND 2: 0

## 2025-05-23 NOTE — PATIENT INSTRUCTIONS
Ear pressure, left  No signs of infection   Refer to ENT for evaluation     Orders:    Referral to ENT; Future    RTC AS SCHEDULED IN SEPTEMBER

## 2025-05-23 NOTE — PROGRESS NOTES
"Fifi Avila is a 65 y.o. female who presents today for an acute visit    Chief Complaint   Patient presents with    Sick Visit     Fifi Avila is coming in today as a SDS for ear and jaw pain. Off and on fr a week and a half has clicking in the jaw. Left thump pain for several weeks.        Symptoms: LEFT ear pressure  Pt states jaw pain is not same as in March  Seems related to her ear pressure  She has been using Flonase/Azelastine for years   Denies cough, sore throat, fevers/chills     Has never seen ENT     Allergies  RX Allergies[1]    Review of Systems  ROS was completed and all systems are negative with the exception of what was noted in the the HPI.       Objective     Most Recent Vitals:  /84   Pulse 82   Ht 1.626 m (5' 4\")   Wt 85.3 kg (188 lb)   SpO2 97%   BMI 32.27 kg/m²       Current Medications  Current Outpatient Medications   Medication Instructions    acetaminophen (ACETAMINOPHEN EXTRA STRENGTH) 500 mg, Every 6 hours PRN    azelastine-fluticasone (Dymista) 137-50 mcg/spray nasal spray USE 1 SPRAY IN EACH NOSTRIL TWICE A DAY FOR 30 DAYS.    cholecalciferol (VITAMIN D-3) 50 mcg, Daily    famotidine (PEPCID) 20 mg, oral, 2 times daily    FeroSuL 325 mg (65 mg iron) tablet TAKE ONE TABLET BY MOUTH ONCE DAILY. TAKE  WITH FOOD.    fexofenadine ODT (ALLEGRA ODT) 15 mg, Daily    icosapent ethyL (Vascepa) 1 gram capsule     predniSONE (Deltasone) 20 mg tablet Take 3 tabs (60mg) daily for 5 days, then take 2 tabs (40mg) daily for 2 days, then take 1 tab (20mg) daily for 2 days.    rosuvastatin (CRESTOR) 20 mg, oral, Daily         Physical Exam  HENT:      Right Ear: Tympanic membrane normal.      Ears:      Comments: Left ear decreased light reflex w/cloudy TM   No erythema, bulging or drainage noted   Pulmonary:      Effort: Pulmonary effort is normal.   Neurological:      Mental Status: She is alert.       Assessment & Plan  Ear pressure, left  No signs of infection   Refer to ENT for " evaluation     Orders:    Referral to ENT; Future                    [1]   Allergies  Allergen Reactions    Nabumetone GI bleeding, GI intolerance, Other and Unknown     Caused blood in stool    Amoxicillin-Pot Clavulanate Diarrhea    Azithromycin GI Upset    Nickel Rash

## 2025-06-01 ENCOUNTER — OFFICE VISIT (OUTPATIENT)
Dept: URGENT CARE | Age: 65
End: 2025-06-01
Payer: COMMERCIAL

## 2025-06-01 VITALS
WEIGHT: 175 LBS | TEMPERATURE: 97.7 F | BODY MASS INDEX: 30.04 KG/M2 | HEART RATE: 80 BPM | OXYGEN SATURATION: 97 % | RESPIRATION RATE: 19 BRPM | DIASTOLIC BLOOD PRESSURE: 86 MMHG | SYSTOLIC BLOOD PRESSURE: 138 MMHG

## 2025-06-01 DIAGNOSIS — H69.92 DYSFUNCTION OF LEFT EUSTACHIAN TUBE: Primary | ICD-10-CM

## 2025-06-01 RX ORDER — METHYLPREDNISOLONE 4 MG/1
TABLET ORAL
Qty: 21 TABLET | Refills: 0 | Status: SHIPPED | OUTPATIENT
Start: 2025-06-01 | End: 2025-06-07

## 2025-06-01 NOTE — PATIENT INSTRUCTIONS
VISIT SUMMARY:  You came in today because of worsening ear pain that has been disrupting your sleep. You have been using Flonase and Allegra for your allergy-related congestion, but the pain and sensation of fullness in your ear have persisted. You also mentioned a previous ear infection in February that required two courses of steroids to resolve. You have tried various self-care measures without much relief.    YOUR PLAN:  -EAR PAIN WITH FLUID ACCUMULATION: You have ear pain with significant fluid buildup behind your ear, which is causing discomfort and disturbing your sleep. This is likely due to inflammation and congestion, possibly worsened by your allergies. We have prescribed a systemic steroid pack to help reduce the inflammation and fluid accumulation, as the intranasal steroids alone have not been enough. Please continue using Flonase until your ENT appointment on July 17, 2025. Avoid trying to 'pop' your ears to prevent any damage. The steroid pack should help provide relief until you see the ENT specialist.    INSTRUCTIONS:  Please follow up with the ENT specialist on July 17, 2025, as scheduled. Continue using Flonase and start the prescribed systemic steroid pack. Avoid attempting to pop your ears to prevent any potential damage.

## 2025-06-01 NOTE — PROGRESS NOTES
Subjective   Patient ID: Fifi Avila is a 65 y.o. female who presents for Earache (Pt c/o lt ear and jaw pain for 2 weeks, saw PCP for it and was told was cloudy, no infection, pain getting worse).  History of Present Illness  Fifi Avila is a 65 year old female who presents with ear pain. She has been experiencing worsening ear pain that disrupts her sleep. Initially, the pain was mild and intermittent, but it has become more severe over time. She has a sensation of fullness in the ear despite using Flonase and Allegra for her allergy-related congestion.    In February, she experienced a different type of ear infection that extended to her jaw, causing difficulty in opening her jaw. She was treated with a course of steroids, which initially did not work, requiring a second, stronger course to alleviate her symptoms.    She has attempted self-care measures such as using Vicks, a heating pad, and a neti pot, as well as taking hot showers to relieve her symptoms. She is concerned about the persistent need to 'pop' her ears.      ROS is negative unless otherwise stated in HPI.       Objective     /86 (BP Location: Left arm, Patient Position: Sitting, BP Cuff Size: Large adult)   Pulse 80   Temp 36.5 °C (97.7 °F) (Oral)   Resp 19   Wt 79.4 kg (175 lb)   SpO2 97%   BMI 30.04 kg/m²        VS: As documented in the triage note and EMR flowsheet from this visit was reviewed  General: Well appearing. No acute distress.   Eyes:  Extraocular movements grossly intact. No scleral icterus.   Head: Atraumatic. Normocephalic.     Neck: No meningismus. No gross masses. Full movement through range of motion  ENT: Posterior oropharynx shows no erythema, exudate or edema.  Uvula is midline without edema.  No stridor or trismus. Left TM with bulging and effusion. No erythema  CV: Regular rhythm. No murmurs, rubs, gallops appreciated.   Resp: Clear to auscultation bilaterally. No respiratory distress.    GI: Nontender.  Soft. No masses. No rebound, rigidity or guarding.   MSK: Symmetric muscle bulk. No gross step offs or deformities.  Skin: Warm, dry. No rashes  Neuro: CN II-VII intact. A&O x3. Speech fluent. Alert. Moving all extremities. Ambulates with normal gait  Psych: Appropriate mood and affect for situation    Point of Care Test & Imaging Results from this visit  No results found for this visit on 06/01/25.   Imaging  No results found.    Cardiology, Vascular, and Other Imaging  No other imaging results found for the past 2 days      Diagnostic study results (if any) were reviewed by Kalli Fernandez PA-C.    Assessment/Plan   Allergies, medications, history, and pertinent labs/EKGs/Imaging reviewed by Kalli Fernandez PA-C.     Assessment & Plan  Patient is a 65-year-old female who presents for left ear pain for the past couple of weeks.  States that she has been using her Flonase and Allegra as directed by primary care without relief.  Vitals are stable.  On examination she does have bulging of the left tympanic membrane with effusion suggesting eustachian tube dysfunction.  Will trial Medrol Dosepak to alleviate symptoms.  Advised to continue with over-the-counter antihistamine and intranasal steroid.  She has follow-up with ENT for reassessment in July.    Orders and Diagnoses  Diagnoses and all orders for this visit:  Dysfunction of left eustachian tube  -     methylPREDNISolone (Medrol Dospak) 4 mg tablets; Follow schedule on package instructions        Disposition:  Home      Kalli Fernandez PA-C     This medical note was created with the assistance of artificial intelligence (AI) for documentation purposes. The content has been reviewed and confirmed by the healthcare provider for accuracy and completeness. Patient consented to the use of audio recording and use of AI during their visit.

## 2025-06-04 ENCOUNTER — OFFICE VISIT (OUTPATIENT)
Dept: DERMATOLOGY | Facility: CLINIC | Age: 65
End: 2025-06-04
Payer: COMMERCIAL

## 2025-06-04 DIAGNOSIS — L57.8 DIFFUSE PHOTODAMAGE OF SKIN: Primary | ICD-10-CM

## 2025-06-04 DIAGNOSIS — L21.9 SEBORRHEIC DERMATITIS: ICD-10-CM

## 2025-06-04 DIAGNOSIS — L85.9 HYPERKERATOSIS: ICD-10-CM

## 2025-06-04 DIAGNOSIS — L90.5 SCAR CONDITIONS AND FIBROSIS OF SKIN: ICD-10-CM

## 2025-06-04 PROCEDURE — 1159F MED LIST DOCD IN RCRD: CPT

## 2025-06-04 PROCEDURE — 1160F RVW MEDS BY RX/DR IN RCRD: CPT

## 2025-06-04 PROCEDURE — 99214 OFFICE O/P EST MOD 30 MIN: CPT

## 2025-06-04 RX ORDER — KETOCONAZOLE 20 MG/G
CREAM TOPICAL 2 TIMES DAILY
Qty: 60 G | Refills: 5 | Status: SHIPPED | OUTPATIENT
Start: 2025-06-04

## 2025-06-04 RX ORDER — TRETINOIN 0.25 MG/G
CREAM TOPICAL NIGHTLY
Qty: 45 G | Refills: 5 | Status: SHIPPED | OUTPATIENT
Start: 2025-06-04 | End: 2026-06-04

## 2025-06-04 NOTE — Clinical Note
Xerosis.  I emphasized the importance of dry, sensitive skin care, including the use of a mild soap, such as Dove, and frequent and aggressive moisturization, at least twice daily and immediately following showers or baths, with recommended over-the-counter moisturizing creams, such as Eucerin, Cetaphil, Cerave, or Aveeno, or Vaseline or Aquaphor ointments.  The patient was also provided an informational hand-out today containing further details on dry skin care.

## 2025-06-04 NOTE — Clinical Note
-Recommend:  -Practice sun protection and sun avoidance, use daily sunscreen (mineral based, SPF 30+), and perform regular self skin exams.  -Start Tretinoin 0.025% cream at night.  The risks, benefits, and side effects of this medication, including the redness, dryness, and irritation expected with its use, were discussed. Start by applying a thin layer to the face 2 nights per week and increase nightly as tolerated.    -Follow up for total body skin exam.

## 2025-06-04 NOTE — Clinical Note
-Discussed nature of the condition  -Reassurance, recommend observation  -Recommend diligent sun protection

## 2025-06-04 NOTE — PROGRESS NOTES
Subjective     Fifi Avila is a 65 y.o. female who presents for the following: Hyperpigmentation , Dry skin on feet, Dry skin around the nose , and Scar.          Review of Systems:  No other skin or systemic complaints other than what is documented elsewhere in the note.    The following portions of the chart were reviewed this encounter and updated as appropriate:   Allergies  Meds         Skin Cancer History  Biopsy Log Book  No skin cancers from Specimen Tracking.    Additional History      Specialty Problems          Dermatology Problems    Hemangioma of skin and subcutaneous tissue    Melanocytic nevi of other parts of face    Melanocytic nevi of scalp and neck    Melanocytic nevi of trunk    Melanocytic nevi of unspecified lower limb, including hip    Melanocytic nevi of unspecified upper limb, including shoulder        Objective   Well appearing patient in no apparent distress; mood and affect are within normal limits.    A focused skin examination was performed. All findings within normal limits unless otherwise noted below.    Assessment/Plan   Skin Exam  1. DIFFUSE PHOTODAMAGE OF SKIN  Face  Diffuse photodamage and mottled pigmentation in sun-exposed areas.  -Recommend:  -Practice sun protection and sun avoidance, use daily sunscreen (mineral based, SPF 30+), and perform regular self skin exams.  -Start Tretinoin 0.025% cream at night.  The risks, benefits, and side effects of this medication, including the redness, dryness, and irritation expected with its use, were discussed. Start by applying a thin layer to the face 2 nights per week and increase nightly as tolerated.    -Follow up for total body skin exam.   tretinoin (Retin-A) 0.025 % cream - Face  Apply topically once daily at bedtime.  2. SEBORRHEIC DERMATITIS  Perinasal creases  Pink, scaly patches.  The potentially chronic and intermittently flaring nature of this condition was discussed.     -Recommend:   -Start Ketoconazole 2% cream. Apply  to affected areas around the nose twice daily as needed.       ketoconazole (NIZOral) 2 % cream - Perinasal creases  Apply topically 2 times a day. To affected area of nose  3. HYPERKERATOSIS (2)  Left Plantar Surface of Heel, Right Plantar Surface of Heel  Hyperkeratosis  -Discussed nature of condition  -Reassurance    -Recommend:  -Moisturize frequently. Discussed using thick emollients such as Aquaphor or Vaseline.   -Start Urea Cream twice daily.   -Using a pumice stone can be helpful in removing calluses.     4. SCAR CONDITIONS AND FIBROSIS OF SKIN (2)  Left Shoulder - Anterior, Left Wrist - Posterior  Atrophic patches  -Discussed nature of the condition  -Reassurance, recommend observation  -Recommend diligent sun protection

## 2025-06-04 NOTE — Clinical Note
The potentially chronic and intermittently flaring nature of this condition was discussed.     -Recommend:   -Start Ketoconazole 2% cream. Apply to affected areas around the nose twice daily as needed.

## 2025-06-04 NOTE — Clinical Note
-Discussed nature of condition  -Reassurance    -Recommend:  -Moisturize frequently. Discussed using thick emollients such as Aquaphor or Vaseline.   -Start Urea Cream twice daily.   -Using a pumice stone can be helpful in removing calluses.

## 2025-06-18 ENCOUNTER — OFFICE VISIT (OUTPATIENT)
Dept: PRIMARY CARE | Facility: CLINIC | Age: 65
End: 2025-06-18
Payer: COMMERCIAL

## 2025-06-18 VITALS
OXYGEN SATURATION: 96 % | BODY MASS INDEX: 30.73 KG/M2 | HEIGHT: 64 IN | WEIGHT: 180 LBS | TEMPERATURE: 98.1 F | HEART RATE: 90 BPM

## 2025-06-18 DIAGNOSIS — H69.92 EUSTACHIAN TUBE DYSFUNCTION, LEFT: Primary | ICD-10-CM

## 2025-06-18 PROCEDURE — 3008F BODY MASS INDEX DOCD: CPT | Performed by: NURSE PRACTITIONER

## 2025-06-18 PROCEDURE — 99212 OFFICE O/P EST SF 10 MIN: CPT | Performed by: NURSE PRACTITIONER

## 2025-06-18 PROCEDURE — 1160F RVW MEDS BY RX/DR IN RCRD: CPT | Performed by: NURSE PRACTITIONER

## 2025-06-18 PROCEDURE — 1036F TOBACCO NON-USER: CPT | Performed by: NURSE PRACTITIONER

## 2025-06-18 PROCEDURE — 1159F MED LIST DOCD IN RCRD: CPT | Performed by: NURSE PRACTITIONER

## 2025-06-18 RX ORDER — PREDNISONE 20 MG/1
40 TABLET ORAL DAILY
Qty: 10 TABLET | Refills: 0 | Status: SHIPPED | OUTPATIENT
Start: 2025-06-18 | End: 2025-06-23

## 2025-06-18 NOTE — PATIENT INSTRUCTIONS
#ETD, LEFT   Nose sprays--cont with Flonase daily    Antihistamines - These medicines are usually used to treat allergies. They help stop itching, sneezing, and runny nose symptoms.   Decongestants - These medicines can help with stuffy nose symptoms.   Avoid dehydration - Drink a lot of fluids, especially before doing physical activity or being in the heat.    See ENT next month

## 2025-06-18 NOTE — PROGRESS NOTES
"Fifi Avila is a 65 y.o. female who presents today for an acute sick visit    Chief Complaint   Patient presents with    Sick Visit     Fifi Avila is coming in today as a SDS for bilateral ear pain x months. Was seen at urgent care and they gave her steroids. Has ent appointment in July. Currently taking sudafed with her allegra.        Symptoms: BILATERAL ear pain   Pt has been having ongoing ear pain/fullness since February   She has been seen mult times by mult providers   Last seen at  on 6/1/25  Pt reported that she c/w azelastine-flonase nasal spray and Allegra daily   She has been on Allegra for years   She went to  on 6/1/25 for severe RIGHT ear pain and she was dx'd w/ETD with effusion and Rx Medrol Dosepak  She states she felt better while taking the steroid, but symptoms returned   Pt has appt with ENT in July        Allergies  RX Allergies[1]    Review of Systems  ROS was completed and all systems are negative with the exception of what was noted in the the HPI.       Objective     Most Recent Vitals:  Pulse 90   Temp 36.7 °C (98.1 °F)   Ht 1.626 m (5' 4\")   Wt 81.6 kg (180 lb)   SpO2 96%   BMI 30.90 kg/m²       Current Medications  Current Outpatient Medications   Medication Instructions    acetaminophen (ACETAMINOPHEN EXTRA STRENGTH) 500 mg, Every 6 hours PRN    azelastine-fluticasone (Dymista) 137-50 mcg/spray nasal spray 1 spray, Each Nostril, 2 times daily    cholecalciferol (VITAMIN D-3) 50 mcg, Daily    famotidine (PEPCID) 20 mg, oral, 2 times daily    FeroSuL 325 mg (65 mg iron) tablet TAKE ONE TABLET BY MOUTH ONCE DAILY. TAKE  WITH FOOD.    icosapent ethyL (Vascepa) 1 gram capsule     ketoconazole (NIZOral) 2 % cream Topical, 2 times daily, To affected area of nose    predniSONE (DELTASONE) 40 mg, oral, Daily    rosuvastatin (CRESTOR) 20 mg, oral, Daily    tretinoin (Retin-A) 0.025 % cream Topical, Nightly         Physical Exam  HENT:      Right Ear: Tympanic membrane normal. There " is no impacted cerumen.      Left Ear: Tympanic membrane normal. There is no impacted cerumen.      Nose: Rhinorrhea present.   Eyes:      Conjunctiva/sclera: Conjunctivae normal.   Neurological:      Mental Status: She is alert and oriented to person, place, and time.   Psychiatric:         Mood and Affect: Mood normal.           Assessment & Plan  Eustachian tube dysfunction, left  Rx Prednisone x 5 days  Orders:    predniSONE (Deltasone) 20 mg tablet; Take 2 tablets (40 mg) by mouth once daily for 5 days.                    [1]   Allergies  Allergen Reactions    Nabumetone GI bleeding, GI intolerance, Other and Unknown     Caused blood in stool    Amoxicillin-Pot Clavulanate Diarrhea    Azithromycin GI Upset    Nickel Rash

## 2025-07-16 ENCOUNTER — APPOINTMENT (OUTPATIENT)
Dept: DERMATOLOGY | Facility: CLINIC | Age: 65
End: 2025-07-16
Payer: COMMERCIAL

## 2025-07-16 DIAGNOSIS — Z00.00 ENCOUNTER FOR GENERAL ADULT MEDICAL EXAMINATION WITHOUT ABNORMAL FINDINGS: ICD-10-CM

## 2025-07-16 DIAGNOSIS — J30.9 ALLERGIC RHINITIS, UNSPECIFIED SEASONALITY, UNSPECIFIED TRIGGER: ICD-10-CM

## 2025-07-16 RX ORDER — FERROUS SULFATE 325(65) MG
1 TABLET ORAL DAILY
Qty: 90 TABLET | Refills: 0 | Status: SHIPPED | OUTPATIENT
Start: 2025-07-16

## 2025-07-16 RX ORDER — AZELASTINE HYDROCHLORIDE, FLUTICASONE PROPIONATE 137; 50 UG/1; UG/1
1 SPRAY, METERED NASAL 2 TIMES DAILY
Qty: 1 EACH | Refills: 3 | Status: SHIPPED | OUTPATIENT
Start: 2025-07-16

## 2025-07-17 ENCOUNTER — APPOINTMENT (OUTPATIENT)
Dept: OTOLARYNGOLOGY | Facility: CLINIC | Age: 65
End: 2025-07-17
Payer: COMMERCIAL

## 2025-07-17 ENCOUNTER — APPOINTMENT (OUTPATIENT)
Dept: AUDIOLOGY | Facility: CLINIC | Age: 65
End: 2025-07-17
Payer: COMMERCIAL

## 2025-07-17 DIAGNOSIS — H90.3 ASYMMETRICAL SENSORINEURAL HEARING LOSS: Primary | ICD-10-CM

## 2025-07-17 DIAGNOSIS — H93.293 ABNORMAL AUDITORY PERCEPTION OF BOTH EARS: ICD-10-CM

## 2025-07-17 DIAGNOSIS — H92.03 OTALGIA OF BOTH EARS: ICD-10-CM

## 2025-07-17 PROCEDURE — 92557 COMPREHENSIVE HEARING TEST: CPT | Performed by: AUDIOLOGIST

## 2025-07-17 PROCEDURE — 92550 TYMPANOMETRY & REFLEX THRESH: CPT | Performed by: AUDIOLOGIST

## 2025-07-17 NOTE — PROGRESS NOTES
AUDIOLOGY ADULT AUDIOMETRIC EVALUATION    Name:  Fifi Avila  :  1960 Age:  65 y.o.  Date of Evaluation:  2025    Reason for visit: Fifi is seen in the clinic today at the request of otolaryngology for an audiologic evaluation.     HISTORY  Patient reports jaw/ear pain left side.  She reports she has a few rounds of steroids since 2025.  She reports in February she fell; imbalanced.   She reports she was referred for evaluation for TMJ; recommended she obtain a mouth guard.       EVALUATION  See scanned audiogram: “Media” > “Audiology Report”.      RESULTS  Otoscopic Evaluation:  Right Ear: clear ear canal  Left Ear: clear ear canal    Immittance Measures:  Tympanometry:  Right Ear: Type A, normal tympanic membrane mobility with normal middle ear pressure  Left Ear: Type A, normal tympanic membrane mobility with normal middle ear pressure    Acoustic Reflexes:  Ipsilateral Right Ear: Acoustic reflexes present within normal limits 500 Hz through 2000 Hz; absent at 4000 Hz   Ipsilateral Left Ear: Acoustic reflexes present within normal limits 500 Hz through 2000 Hz; absent at 4000 Hz   Contralateral Right Ear: did not evaluate  Contralateral Left Ear: did not evaluate    Distortion Product Otoacoustic Emissions (DPOAEs):  Right Ear: Passed 1000 Hz through 4000 Hz; refer at 5000 Hz and above   Left Ear: Passed 1500 Hz, 2000 Hz, 4000 Hz; refer at all other frequencies    Audiometry:  Test Technique and Reliability:   Standard audiometry via supra-aural headphones. Reliability is good.    Pure tone air and bone conduction audiometry:  Right Ear: Borderline normal hearing sensitivity 2000 Hz-4000 Hz, sloping to mild hearing loss 6000 Hz-8000 Hz   Left Ear: Asymmetrical mild sloping to moderately severe sensorineural hearing loss 2000 Hz and above     Speech Audiometry (Word Recognition Scores):   Right Ear: Excellent at most comfortable level of loudness   Left Ear: Excellent at most  comfortable level of loudness     IMPRESSIONS    Left ear: Asymmetrical mild sloping to moderately severe sensorineural hearing loss 2000 Hz and above   Right ear: mild hearing loss at 6000 Hz, 8000 Hz     RECOMMENDATIONS  - Follow up with otolaryngology; note asymmetry   - Audiologic evaluation in conjunction with otologic care, if an acute change is noted, and/or annually.    PATIENT EDUCATION  Discussed results, impressions and recommendations with the patient. Questions were addressed and the patient was encouraged to contact our office should concerns arise.    Time for this encounter: 1737/8900    Martha Valentino M.A., CCC/A   Licensed Audiologist

## 2025-07-22 ENCOUNTER — APPOINTMENT (OUTPATIENT)
Dept: OTOLARYNGOLOGY | Facility: CLINIC | Age: 65
End: 2025-07-22
Payer: COMMERCIAL

## 2025-07-22 VITALS — HEIGHT: 64 IN | WEIGHT: 180 LBS | BODY MASS INDEX: 30.73 KG/M2 | TEMPERATURE: 96.9 F

## 2025-07-22 DIAGNOSIS — H90.3 ASYMMETRICAL SENSORINEURAL HEARING LOSS: ICD-10-CM

## 2025-07-22 DIAGNOSIS — H93.8X3 EAR PRESSURE, BILATERAL: Primary | ICD-10-CM

## 2025-07-22 DIAGNOSIS — M26.609 TEMPOROMANDIBULAR JOINT DISORDER: ICD-10-CM

## 2025-07-22 DIAGNOSIS — R26.89 IMBALANCE: ICD-10-CM

## 2025-07-22 DIAGNOSIS — H93.12 LEFT-SIDED TINNITUS: ICD-10-CM

## 2025-07-22 PROCEDURE — 99214 OFFICE O/P EST MOD 30 MIN: CPT

## 2025-07-22 PROCEDURE — 3008F BODY MASS INDEX DOCD: CPT

## 2025-07-22 PROCEDURE — 1036F TOBACCO NON-USER: CPT

## 2025-07-22 PROCEDURE — 1159F MED LIST DOCD IN RCRD: CPT

## 2025-07-22 NOTE — PROGRESS NOTES
"Chief Complaint   Patient presents with    New Patient Visit     DECREASED HEARING, F/U AUDIO DONE 7/17/25     HPI:  Fifi Avila is a 65 y.o. female presents with complaints of bilateral ear pressure and intermittent pain originally started in February treated for otitis media on the left and how symptoms her bilateral on and off.  Has been treated with oral steroids several times which seemed to help why she is taking them but symptoms quickly returned.  Recent diagnosis of TMJ doing home exercises and OTC mouthguard.  Reports left-sided tinnitus.  Reports pressure and pain bilateral ears on and off.  Reports feeling imbalanced at times usually when getting up from a sitting position, feels like she is falling to the right but always stops with her right foot first.  Reports fall in February also.  Denies headache, dizziness or vertigo.  We reviewed audiogram from 7/17/2025 which shows left asymmetric mild sloping to moderately severe hearing loss at 2000 Hz and above right ear mild hearing loss at 6 6000 to 8000 Hz.  Tympanograms are Type A and Word recognition is excellent.        PMH:  Medical History[1]  Surgical History[2]      Medications:   Current Medications[3]     Allergies:  Allergies[4]     ROS:  Review of systems normal unless stated otherwise in the HPI and/or PMH.    Physical Exam:  Temperature 36.1 °C (96.9 °F), height 1.626 m (5' 4\"), weight 81.6 kg (180 lb). Body mass index is 30.9 kg/m².     GENERAL APPEARANCE: Well developed and well nourished.  Alert and oriented in no acute distress.  Normal vocal quality.      HEAD/FACE: No erythema or edema or facial tenderness.  Normal facial nerve function bilaterally.    EAR:       EXTERNAL: Normal pinnas and external auditory canals without lesion or obstructing wax.       MIDDLE EAR: Tympanic membranes intact and mobile with normal landmarks.  Middle ear space appears well aerated.       TUBE STATUS: N/A       MASTOID CAVITY: N/A       HEARING: Gross " hearing assessment is within normal limits.      NOSE:       VISUALIZED USING: Anterior rhinoscopy with headlight and nasal speculum.       DORSUM: Midline, nontraumatic appearance.       MUCOSA: Normal-appearing.       SECRETIONS: Normal.       SEPTUM: Midline and nonobstructing.       INFERIOR TURBINATES: Normal.       MIDDLE TURBINATES/MEATUS: N/A       BLEEDING: N/A         ORAL CAVITY/PHARYNX:       TEETH: Adequate dentition.       TONGUE: No mass or lesion.  Normal mobility.       FLOOR OF MOUTH: No mass or lesion.       PALATE: Normal hard palate, soft palate, and uvula.       OROPHARYNX: Normal without mass or lesion.       BUCCAL MUCOSA/GBS: Normal without mass or lesion.       LIPS: Normal.    LARYNX/HYPOPHARYNX/NASOPHARYNX: N/A    NECK: No palpable masses or abnormal adenopathy.  Trachea is midline.    THYROID: No thyromegaly or palpable nodule.    SALIVARY GLANDS: Normal bilateral parotid and submandibular glands by inspection and palpation.    TMJ's: Tender bilaterally with some popping and crunching    NEURO: Cranial nerve exam grossly normal bilaterally.       Assessment/Plan   Fifi was seen today for new patient visit.  Diagnoses and all orders for this visit:  Asymmetrical sensorineural hearing loss (Primary)  -     MR IAC w and wo IV contrast; Future  Left-sided tinnitus  Ear pressure, bilateral  Temporomandibular joint disorder     Fifi and I discussed normal ear exam.  Further discussion about referred symptoms from TMJ.  Recommended comfort measures warm compresses, and massage in addition to exercises.  We will get an MRI to evaluate asymmetry in hearing.  She will return for results.  May be interested in PT for TMJ.     No follow-ups on file.   This chart was completed using voice recognition transcription software. Please excuse any errors of transcription including grammatical, punctuation, syntax and spelling errors.  Leah Thomas, APRADALGISA-CNP         [1]   Past Medical  History:  Diagnosis Date    Astigmatism of both eyes     Cholelithiasis     Class 1 obesity with body mass index (BMI) of 30.0 to 30.9 in adult 06/07/2024    DJD (degenerative joint disease), cervical     Dry eyes     GERD (gastroesophageal reflux disease)     Hyperlipidemia     Intermittent diarrhea     Persistant    Laryngopharyngeal reflux (LPR)     Positive colorectal cancer screening using Cologuard test 02/24/2023    Rhinitis, allergic    [2]   Past Surgical History:  Procedure Laterality Date    CHOLECYSTECTOMY  07/09/2024    COLONOSCOPY      DILATION AND CURETTAGE OF UTERUS  12/13/2022    TONSILLECTOMY  09/01/2022   [3]   Current Outpatient Medications:     azelastine-fluticasone (Dymista) 137-50 mcg/spray nasal spray, use 1 spray in each nostril twice daily, Disp: 1 each, Rfl: 3    cholecalciferol (Vitamin D-3) 50 MCG (2000 UT) tablet, Take 1 tablet (50 mcg) by mouth once daily., Disp: , Rfl:     famotidine (Pepcid) 20 mg tablet, TAKE ONE TABLET BY MOUTH TWO TIMES A DAY, Disp: 180 tablet, Rfl: 3    ferrous sulfate 325 mg (65 mg elemental) tablet, TAKE ONE TABLET BY MOUTH ONCE DAILY WITH FOOD, Disp: 90 tablet, Rfl: 0    icosapent ethyL (Vascepa) 1 gram capsule, , Disp: , Rfl:     ketoconazole (NIZOral) 2 % cream, Apply topically 2 times a day. To affected area of nose, Disp: 60 g, Rfl: 5    rosuvastatin (Crestor) 20 mg tablet, Take 1 tablet (20 mg) by mouth once daily., Disp: 100 tablet, Rfl: 3    tretinoin (Retin-A) 0.025 % cream, Apply topically once daily at bedtime., Disp: 45 g, Rfl: 5    acetaminophen (Acetaminophen Extra Strength) 500 mg tablet, Take 1 tablet (500 mg) by mouth every 6 hours if needed for moderate pain (4 - 6). (Patient not taking: Reported on 7/22/2025), Disp: , Rfl:   [4]   Allergies  Allergen Reactions    Nabumetone GI bleeding, GI intolerance, Other and Unknown     Caused blood in stool    Amoxicillin-Pot Clavulanate Diarrhea    Azithromycin GI Upset    Nickel Rash

## 2025-08-05 ENCOUNTER — HOSPITAL ENCOUNTER (OUTPATIENT)
Dept: RADIOLOGY | Facility: HOSPITAL | Age: 65
Discharge: HOME | End: 2025-08-05
Payer: COMMERCIAL

## 2025-08-05 DIAGNOSIS — H90.3 ASYMMETRICAL SENSORINEURAL HEARING LOSS: ICD-10-CM

## 2025-08-05 PROCEDURE — A9575 INJ GADOTERATE MEGLUMI 0.1ML: HCPCS

## 2025-08-05 PROCEDURE — 2550000001 HC RX 255 CONTRASTS

## 2025-08-05 PROCEDURE — 70553 MRI BRAIN STEM W/O & W/DYE: CPT

## 2025-08-05 PROCEDURE — 70553 MRI BRAIN STEM W/O & W/DYE: CPT | Performed by: RADIOLOGY

## 2025-08-05 RX ORDER — GADOTERATE MEGLUMINE 376.9 MG/ML
20 INJECTION INTRAVENOUS
Status: COMPLETED | OUTPATIENT
Start: 2025-08-05 | End: 2025-08-05

## 2025-08-05 RX ADMIN — GADOTERATE MEGLUMINE 17 ML: 376.9 INJECTION INTRAVENOUS at 09:05

## 2025-08-15 ENCOUNTER — APPOINTMENT (OUTPATIENT)
Dept: OTOLARYNGOLOGY | Facility: CLINIC | Age: 65
End: 2025-08-15
Payer: COMMERCIAL

## 2025-08-15 VITALS — HEIGHT: 64 IN | BODY MASS INDEX: 30.56 KG/M2 | WEIGHT: 179 LBS | TEMPERATURE: 97.6 F

## 2025-08-15 DIAGNOSIS — H93.8X3 EAR PRESSURE, BILATERAL: ICD-10-CM

## 2025-08-15 DIAGNOSIS — H92.03 REFERRED EAR PAIN, BILATERAL: ICD-10-CM

## 2025-08-15 DIAGNOSIS — M26.609 TEMPOROMANDIBULAR JOINT DISORDER: Primary | ICD-10-CM

## 2025-08-15 DIAGNOSIS — M54.2 NECK PAIN: ICD-10-CM

## 2025-08-15 PROCEDURE — 1159F MED LIST DOCD IN RCRD: CPT

## 2025-08-15 PROCEDURE — 1036F TOBACCO NON-USER: CPT

## 2025-08-15 PROCEDURE — 3008F BODY MASS INDEX DOCD: CPT

## 2025-08-15 PROCEDURE — 99213 OFFICE O/P EST LOW 20 MIN: CPT

## 2025-08-19 ENCOUNTER — APPOINTMENT (OUTPATIENT)
Dept: OTOLARYNGOLOGY | Facility: CLINIC | Age: 65
End: 2025-08-19
Payer: COMMERCIAL

## 2025-08-19 ENCOUNTER — TELEPHONE (OUTPATIENT)
Dept: AUDIOLOGY | Facility: CLINIC | Age: 65
End: 2025-08-19

## 2025-08-21 ENCOUNTER — TELEPHONE (OUTPATIENT)
Dept: PRIMARY CARE | Facility: CLINIC | Age: 65
End: 2025-08-21
Payer: COMMERCIAL

## 2025-08-27 ENCOUNTER — TELEMEDICINE (OUTPATIENT)
Dept: PRIMARY CARE | Facility: CLINIC | Age: 65
End: 2025-08-27
Payer: COMMERCIAL

## 2025-08-27 DIAGNOSIS — R68.84 CHRONIC JAW PAIN: Primary | ICD-10-CM

## 2025-08-27 DIAGNOSIS — G89.29 CHRONIC JAW PAIN: Primary | ICD-10-CM

## 2025-08-27 PROCEDURE — 99213 OFFICE O/P EST LOW 20 MIN: CPT | Performed by: NURSE PRACTITIONER

## 2025-08-27 PROCEDURE — 1160F RVW MEDS BY RX/DR IN RCRD: CPT | Performed by: NURSE PRACTITIONER

## 2025-08-27 PROCEDURE — 1159F MED LIST DOCD IN RCRD: CPT | Performed by: NURSE PRACTITIONER

## 2025-08-27 PROCEDURE — 1036F TOBACCO NON-USER: CPT | Performed by: NURSE PRACTITIONER

## 2025-08-27 RX ORDER — IBUPROFEN 600 MG/1
600 TABLET, FILM COATED ORAL EVERY 8 HOURS PRN
Qty: 90 TABLET | Refills: 0 | Status: SHIPPED | OUTPATIENT
Start: 2025-08-27

## 2025-09-01 LAB
ATRIAL RATE: 62 BPM
P AXIS: 28 DEGREES
P OFFSET: 185 MS
P ONSET: 139 MS
PR INTERVAL: 166 MS
Q ONSET: 222 MS
QRS COUNT: 9 BEATS
QRS DURATION: 74 MS
QT INTERVAL: 400 MS
QTC CALCULATION(BAZETT): 406 MS
QTC FREDERICIA: 404 MS
R AXIS: 5 DEGREES
T AXIS: 31 DEGREES
T OFFSET: 422 MS
VENTRICULAR RATE: 62 BPM

## 2025-09-26 ENCOUNTER — APPOINTMENT (OUTPATIENT)
Dept: PRIMARY CARE | Facility: CLINIC | Age: 65
End: 2025-09-26
Payer: COMMERCIAL

## 2025-10-27 ENCOUNTER — APPOINTMENT (OUTPATIENT)
Dept: DERMATOLOGY | Facility: CLINIC | Age: 65
End: 2025-10-27
Payer: COMMERCIAL

## 2025-12-05 ENCOUNTER — APPOINTMENT (OUTPATIENT)
Dept: DERMATOLOGY | Facility: CLINIC | Age: 65
End: 2025-12-05
Payer: COMMERCIAL

## (undated) DEVICE — CABLE, ELECTROSURGICAL, MONOPOLAR, LAPAROSCOPIC, 10 FT, LF

## (undated) DEVICE — CLIP, ENDO, CLINCH II, W/RATCHET, ON/OFF, CLINCH II, 5 MM

## (undated) DEVICE — ADHESIVE, SKIN, LIQUIBAND EXCEED

## (undated) DEVICE — Device

## (undated) DEVICE — DRAPE PACK, LAPAROSCOPIC CHOLECYSTECTOMY, CUSTOM, GEAUGA

## (undated) DEVICE — CAUTERY, PENCIL, PUSH BUTTON, SMOKE EVAC, 70MM

## (undated) DEVICE — SCISSOR, MINI ENDO CUT, TIPS, DISP

## (undated) DEVICE — APPLICATOR, ARISTA, FLEXITIP, XL, LF

## (undated) DEVICE — SOLUTION, IRRIGATION, SODIUM CHLORIDE 0.9%, 1000 ML, POUR BOTTLE

## (undated) DEVICE — NEEDLE, SAFETY, 21 G X 1.5 IN

## (undated) DEVICE — TROCAR, OPTICAL BLADELESS 5MM X 100 W/ADVANCED FIXATION

## (undated) DEVICE — TUBE SET, PNEUMOCLEAR, SMOKE EVACU, HIGH-FLOW

## (undated) DEVICE — CLIP, ENDO APPLIER LIGAMAX 5MM

## (undated) DEVICE — GOWN, ASTOUND, L

## (undated) DEVICE — DRAPE, INSTRUMENT, W/POUCH, STERI DRAPE, 9 5/8 X 18 LONG

## (undated) DEVICE — ASSEMBLY, STRYKER FLOW 2, SUCTION IRRIGATOR, WITH TIP

## (undated) DEVICE — NEEDLE, INSUFFLATION, 13GAX120MM, DISP

## (undated) DEVICE — SOLUTION, INJECTION, USP, SODIUM CHLORIDE 0.9%, .9, NACL, 1000 ML, BAG

## (undated) DEVICE — ELECTRODE, ELECTROSURGICAL, BLADE, INSULATED, ENT/IMA, STERILE

## (undated) DEVICE — CARE KIT, LAPAROSCOPIC, ADVANCED

## (undated) DEVICE — APPLICATOR, CHLORAPREP, W/ORANGE TINT, 26ML

## (undated) DEVICE — ACCESS SYS, KII SHIELDED BLADED, Z-THREAD, 5X100CM

## (undated) DEVICE — TROCAR, KII OPTICAL SEPARATOR, 8MM X 100MM,  Z-THREAD

## (undated) DEVICE — RETRIEVAL SYSTEM, MONARCH INZII, 5MM

## (undated) DEVICE — SUTURE, VICRYL, 4-0, 18 IN, PS2, UNDYED